# Patient Record
Sex: FEMALE | Race: BLACK OR AFRICAN AMERICAN | NOT HISPANIC OR LATINO | Employment: UNEMPLOYED | URBAN - METROPOLITAN AREA
[De-identification: names, ages, dates, MRNs, and addresses within clinical notes are randomized per-mention and may not be internally consistent; named-entity substitution may affect disease eponyms.]

---

## 2018-11-24 ENCOUNTER — HOSPITAL ENCOUNTER (INPATIENT)
Facility: OTHER | Age: 23
LOS: 6 days | Discharge: HOME | DRG: 419 | End: 2018-11-30
Attending: EMERGENCY MEDICINE | Admitting: EMERGENCY MEDICINE
Payer: MEDICAID

## 2018-11-24 DIAGNOSIS — R10.11 RUQ ABDOMINAL PAIN: ICD-10-CM

## 2018-11-24 DIAGNOSIS — K80.00 CALCULUS OF GALLBLADDER WITH ACUTE CHOLECYSTITIS WITHOUT OBSTRUCTION: ICD-10-CM

## 2018-11-24 DIAGNOSIS — R10.13 EPIGASTRIC ABDOMINAL PAIN: ICD-10-CM

## 2018-11-24 DIAGNOSIS — R10.11 INTRACTABLE RIGHT UPPER QUADRANT ABDOMINAL PAIN: ICD-10-CM

## 2018-11-24 DIAGNOSIS — K80.20 CALCULUS OF GALLBLADDER WITHOUT CHOLECYSTITIS WITHOUT OBSTRUCTION: Primary | ICD-10-CM

## 2018-11-24 DIAGNOSIS — K80.50 CHOLEDOCHOLITHIASIS: ICD-10-CM

## 2018-11-24 LAB
ALBUMIN SERPL BCP-MCNC: 3.9 G/DL
ALP SERPL-CCNC: 80 U/L
ALT SERPL W/O P-5'-P-CCNC: 14 U/L
ANION GAP SERPL CALC-SCNC: 10 MMOL/L
AST SERPL-CCNC: 13 U/L
B-HCG UR QL: NEGATIVE
BASOPHILS # BLD AUTO: 0.04 K/UL
BASOPHILS NFR BLD: 0.6 %
BILIRUB SERPL-MCNC: 0.2 MG/DL
BILIRUB UR QL STRIP: NEGATIVE
BUN SERPL-MCNC: 11 MG/DL
CALCIUM SERPL-MCNC: 9.5 MG/DL
CHLORIDE SERPL-SCNC: 107 MMOL/L
CLARITY UR: CLEAR
CO2 SERPL-SCNC: 22 MMOL/L
COLOR UR: YELLOW
CREAT SERPL-MCNC: 0.8 MG/DL
CTP QC/QA: YES
DIFFERENTIAL METHOD: ABNORMAL
EOSINOPHIL # BLD AUTO: 0.2 K/UL
EOSINOPHIL NFR BLD: 2.4 %
ERYTHROCYTE [DISTWIDTH] IN BLOOD BY AUTOMATED COUNT: 13.9 %
EST. GFR  (AFRICAN AMERICAN): >60 ML/MIN/1.73 M^2
EST. GFR  (NON AFRICAN AMERICAN): >60 ML/MIN/1.73 M^2
GLUCOSE SERPL-MCNC: 84 MG/DL
GLUCOSE UR QL STRIP: NEGATIVE
HCT VFR BLD AUTO: 37.5 %
HGB BLD-MCNC: 12.6 G/DL
HGB UR QL STRIP: NEGATIVE
KETONES UR QL STRIP: NEGATIVE
LEUKOCYTE ESTERASE UR QL STRIP: NEGATIVE
LIPASE SERPL-CCNC: 27 U/L
LYMPHOCYTES # BLD AUTO: 2.1 K/UL
LYMPHOCYTES NFR BLD: 31.9 %
MCH RBC QN AUTO: 27.2 PG
MCHC RBC AUTO-ENTMCNC: 33.6 G/DL
MCV RBC AUTO: 81 FL
MONOCYTES # BLD AUTO: 0.5 K/UL
MONOCYTES NFR BLD: 7.3 %
NEUTROPHILS # BLD AUTO: 3.8 K/UL
NEUTROPHILS NFR BLD: 57.7 %
NITRITE UR QL STRIP: NEGATIVE
PH UR STRIP: 6 [PH] (ref 5–8)
PLATELET # BLD AUTO: 277 K/UL
PMV BLD AUTO: 11.1 FL
POTASSIUM SERPL-SCNC: 3.9 MMOL/L
PROT SERPL-MCNC: 7.2 G/DL
PROT UR QL STRIP: NEGATIVE
RBC # BLD AUTO: 4.63 M/UL
SODIUM SERPL-SCNC: 139 MMOL/L
SP GR UR STRIP: 1.01 (ref 1–1.03)
URN SPEC COLLECT METH UR: NORMAL
UROBILINOGEN UR STRIP-ACNC: NEGATIVE EU/DL
WBC # BLD AUTO: 6.67 K/UL

## 2018-11-24 PROCEDURE — 81025 URINE PREGNANCY TEST: CPT | Performed by: EMERGENCY MEDICINE

## 2018-11-24 PROCEDURE — 25000003 PHARM REV CODE 250: Performed by: PHYSICIAN ASSISTANT

## 2018-11-24 PROCEDURE — 99285 EMERGENCY DEPT VISIT HI MDM: CPT | Mod: 25

## 2018-11-24 PROCEDURE — G0378 HOSPITAL OBSERVATION PER HR: HCPCS

## 2018-11-24 PROCEDURE — 96361 HYDRATE IV INFUSION ADD-ON: CPT

## 2018-11-24 PROCEDURE — 93005 ELECTROCARDIOGRAM TRACING: CPT

## 2018-11-24 PROCEDURE — 81003 URINALYSIS AUTO W/O SCOPE: CPT

## 2018-11-24 PROCEDURE — 80053 COMPREHEN METABOLIC PANEL: CPT

## 2018-11-24 PROCEDURE — 93010 ELECTROCARDIOGRAM REPORT: CPT | Mod: ,,, | Performed by: INTERNAL MEDICINE

## 2018-11-24 PROCEDURE — 96375 TX/PRO/DX INJ NEW DRUG ADDON: CPT

## 2018-11-24 PROCEDURE — S0028 INJECTION, FAMOTIDINE, 20 MG: HCPCS | Performed by: PHYSICIAN ASSISTANT

## 2018-11-24 PROCEDURE — 96374 THER/PROPH/DIAG INJ IV PUSH: CPT

## 2018-11-24 PROCEDURE — 12000002 HC ACUTE/MED SURGE SEMI-PRIVATE ROOM

## 2018-11-24 PROCEDURE — 85025 COMPLETE CBC W/AUTO DIFF WBC: CPT

## 2018-11-24 PROCEDURE — 63600175 PHARM REV CODE 636 W HCPCS: Performed by: PHYSICIAN ASSISTANT

## 2018-11-24 PROCEDURE — 83690 ASSAY OF LIPASE: CPT

## 2018-11-24 RX ORDER — ONDANSETRON 2 MG/ML
4 INJECTION INTRAMUSCULAR; INTRAVENOUS
Status: COMPLETED | OUTPATIENT
Start: 2018-11-24 | End: 2018-11-24

## 2018-11-24 RX ORDER — FAMOTIDINE 10 MG/ML
20 INJECTION INTRAVENOUS
Status: COMPLETED | OUTPATIENT
Start: 2018-11-24 | End: 2018-11-24

## 2018-11-24 RX ORDER — KETOROLAC TROMETHAMINE 30 MG/ML
15 INJECTION, SOLUTION INTRAMUSCULAR; INTRAVENOUS
Status: COMPLETED | OUTPATIENT
Start: 2018-11-25 | End: 2018-11-25

## 2018-11-24 RX ORDER — ACETAMINOPHEN 325 MG/1
650 TABLET ORAL EVERY 8 HOURS PRN
Status: CANCELLED | OUTPATIENT
Start: 2018-11-25

## 2018-11-24 RX ADMIN — FAMOTIDINE 20 MG: 10 INJECTION, SOLUTION INTRAVENOUS at 10:11

## 2018-11-24 RX ADMIN — SODIUM CHLORIDE 1000 ML: 0.9 INJECTION, SOLUTION INTRAVENOUS at 10:11

## 2018-11-24 RX ADMIN — ONDANSETRON 4 MG: 2 INJECTION INTRAMUSCULAR; INTRAVENOUS at 10:11

## 2018-11-25 PROBLEM — K80.00 CALCULUS OF GALLBLADDER WITH ACUTE CHOLECYSTITIS WITHOUT OBSTRUCTION: Status: ACTIVE | Noted: 2018-11-24

## 2018-11-25 PROBLEM — R10.11 INTRACTABLE RIGHT UPPER QUADRANT ABDOMINAL PAIN: Status: ACTIVE | Noted: 2018-11-25

## 2018-11-25 LAB
ALBUMIN SERPL BCP-MCNC: 3.6 G/DL
ALP SERPL-CCNC: 77 U/L
ALT SERPL W/O P-5'-P-CCNC: 13 U/L
ANION GAP SERPL CALC-SCNC: 9 MMOL/L
AST SERPL-CCNC: 25 U/L
BASOPHILS # BLD AUTO: 0.02 K/UL
BASOPHILS NFR BLD: 0.2 %
BILIRUB SERPL-MCNC: 0.4 MG/DL
BUN SERPL-MCNC: 10 MG/DL
CALCIUM SERPL-MCNC: 8.9 MG/DL
CHLORIDE SERPL-SCNC: 108 MMOL/L
CHOLEST SERPL-MCNC: 158 MG/DL
CHOLEST/HDLC SERPL: 3.7 {RATIO}
CO2 SERPL-SCNC: 23 MMOL/L
CREAT SERPL-MCNC: 0.8 MG/DL
DIFFERENTIAL METHOD: ABNORMAL
EOSINOPHIL # BLD AUTO: 0.2 K/UL
EOSINOPHIL NFR BLD: 1.8 %
ERYTHROCYTE [DISTWIDTH] IN BLOOD BY AUTOMATED COUNT: 13.8 %
EST. GFR  (AFRICAN AMERICAN): >60 ML/MIN/1.73 M^2
EST. GFR  (NON AFRICAN AMERICAN): >60 ML/MIN/1.73 M^2
ESTIMATED AVG GLUCOSE: 103 MG/DL
GLUCOSE SERPL-MCNC: 91 MG/DL
HBA1C MFR BLD HPLC: 5.2 %
HCT VFR BLD AUTO: 36.1 %
HDLC SERPL-MCNC: 43 MG/DL
HDLC SERPL: 27.2 %
HGB BLD-MCNC: 12 G/DL
LDLC SERPL CALC-MCNC: 102.4 MG/DL
LYMPHOCYTES # BLD AUTO: 2.4 K/UL
LYMPHOCYTES NFR BLD: 29.3 %
MAGNESIUM SERPL-MCNC: 2 MG/DL
MCH RBC QN AUTO: 26.8 PG
MCHC RBC AUTO-ENTMCNC: 33.2 G/DL
MCV RBC AUTO: 81 FL
MONOCYTES # BLD AUTO: 0.7 K/UL
MONOCYTES NFR BLD: 8.6 %
NEUTROPHILS # BLD AUTO: 4.9 K/UL
NEUTROPHILS NFR BLD: 59.9 %
NONHDLC SERPL-MCNC: 115 MG/DL
PHOSPHATE SERPL-MCNC: 3.9 MG/DL
PLATELET # BLD AUTO: 287 K/UL
PMV BLD AUTO: 11 FL
POTASSIUM SERPL-SCNC: 3.7 MMOL/L
PROT SERPL-MCNC: 6.6 G/DL
RBC # BLD AUTO: 4.47 M/UL
SODIUM SERPL-SCNC: 140 MMOL/L
TRIGL SERPL-MCNC: 63 MG/DL
WBC # BLD AUTO: 8.13 K/UL

## 2018-11-25 PROCEDURE — G0378 HOSPITAL OBSERVATION PER HR: HCPCS

## 2018-11-25 PROCEDURE — 83735 ASSAY OF MAGNESIUM: CPT

## 2018-11-25 PROCEDURE — 80053 COMPREHEN METABOLIC PANEL: CPT

## 2018-11-25 PROCEDURE — 99220 PR INITIAL OBSERVATION CARE,LEVL III: CPT | Mod: ,,, | Performed by: NURSE PRACTITIONER

## 2018-11-25 PROCEDURE — 96375 TX/PRO/DX INJ NEW DRUG ADDON: CPT

## 2018-11-25 PROCEDURE — 94761 N-INVAS EAR/PLS OXIMETRY MLT: CPT

## 2018-11-25 PROCEDURE — 83036 HEMOGLOBIN GLYCOSYLATED A1C: CPT

## 2018-11-25 PROCEDURE — 84100 ASSAY OF PHOSPHORUS: CPT

## 2018-11-25 PROCEDURE — 25000003 PHARM REV CODE 250: Performed by: NURSE PRACTITIONER

## 2018-11-25 PROCEDURE — 85025 COMPLETE CBC W/AUTO DIFF WBC: CPT

## 2018-11-25 PROCEDURE — 36415 COLL VENOUS BLD VENIPUNCTURE: CPT

## 2018-11-25 PROCEDURE — 63600175 PHARM REV CODE 636 W HCPCS: Performed by: PHYSICIAN ASSISTANT

## 2018-11-25 PROCEDURE — 11000001 HC ACUTE MED/SURG PRIVATE ROOM

## 2018-11-25 PROCEDURE — 80061 LIPID PANEL: CPT

## 2018-11-25 RX ORDER — KETOROLAC TROMETHAMINE 30 MG/ML
15 INJECTION, SOLUTION INTRAMUSCULAR; INTRAVENOUS EVERY 6 HOURS PRN
Status: DISPENSED | OUTPATIENT
Start: 2018-11-25 | End: 2018-11-28

## 2018-11-25 RX ORDER — SODIUM CHLORIDE 9 MG/ML
INJECTION, SOLUTION INTRAVENOUS CONTINUOUS
Status: DISCONTINUED | OUTPATIENT
Start: 2018-11-25 | End: 2018-11-29

## 2018-11-25 RX ORDER — ACETAMINOPHEN 325 MG/1
650 TABLET ORAL EVERY 8 HOURS PRN
Status: DISCONTINUED | OUTPATIENT
Start: 2018-11-25 | End: 2018-11-28

## 2018-11-25 RX ORDER — SODIUM CHLORIDE 0.9 % (FLUSH) 0.9 %
5 SYRINGE (ML) INJECTION
Status: DISCONTINUED | OUTPATIENT
Start: 2018-11-25 | End: 2018-11-30 | Stop reason: HOSPADM

## 2018-11-25 RX ORDER — ONDANSETRON 2 MG/ML
4 INJECTION INTRAMUSCULAR; INTRAVENOUS EVERY 8 HOURS PRN
Status: DISCONTINUED | OUTPATIENT
Start: 2018-11-25 | End: 2018-11-30 | Stop reason: HOSPADM

## 2018-11-25 RX ADMIN — KETOROLAC TROMETHAMINE 15 MG: 30 INJECTION, SOLUTION INTRAMUSCULAR at 12:11

## 2018-11-25 RX ADMIN — KETOROLAC TROMETHAMINE 15 MG: 30 INJECTION, SOLUTION INTRAMUSCULAR at 11:11

## 2018-11-25 RX ADMIN — SODIUM CHLORIDE: 0.9 INJECTION, SOLUTION INTRAVENOUS at 07:11

## 2018-11-25 RX ADMIN — KETOROLAC TROMETHAMINE 15 MG: 30 INJECTION, SOLUTION INTRAMUSCULAR at 07:11

## 2018-11-25 RX ADMIN — SODIUM CHLORIDE: 0.9 INJECTION, SOLUTION INTRAVENOUS at 01:11

## 2018-11-25 RX ADMIN — SODIUM CHLORIDE: 0.9 INJECTION, SOLUTION INTRAVENOUS at 04:11

## 2018-11-25 NOTE — ASSESSMENT & PLAN NOTE
-US abdomen 11/24/2018:  Cholelithiasis; sonographic Rios sign suggesting acute cholecystitis; no secondary signs of gallbladder wall thickening, hyperemia, or pericholecystic fluid seen.  -labs without leukocytosis, lipase normal, liver function tests normal; will monitor with morning labs  -General Surgery consult pending  -NPO for now and continue normal saline at 125 cc/hour

## 2018-11-25 NOTE — ASSESSMENT & PLAN NOTE
-US abdomen 11/24/2018:  Cholelithiasis; sonographic Rios sign suggesting acute cholecystitis; no secondary signs of gallbladder wall thickening, hyperemia, or pericholecystic fluid seen.  -pain control with toradol 15 mg intravenously every six hours as needed

## 2018-11-25 NOTE — PROGRESS NOTES
"[facility] Hospital Medicine  Progress Note    Patient Name: Nicolasa Peter  MRN: [id number]  Patient Class: OP- Observation   Admission Date: 11/24/2018  Length of Stay: 0 days  Attending Physician: Song Soni MD  Primary Care Provider: Edgar Vega MD        Subjective:     Principal Problem:Calculus of gallbladder with acute cholecystitis without obstruction    HPI:  Ms. Nicolasa Peter is a 23 year old female with no significant past medical history who presented to the [facility] Department with complaint of worsening epigastric and right upper quadrant abdominal pain.  She reports the pain as "sharp aching" that radiates to right flank is accompanied by abdominal distention.  She reports intermittent nausea without vomiting or diarrhea.  She states as a result that she has decreased appetite and that pain worsens after eating.  She states she has tried Tums and naprosyn without relief.  She states that her symptoms began on Wednesday. She denies fever/ chills, vomiting or diarrhea. Initial work up in the ED with abdominal ultrasound positive for sonographic Rios's sign and evidence of cholelithiasis with concern for cholecystitis.  She will be admitted under Observation for further evaluation and management  of cholelithiasis.          Hospital Course:  Ms. Nicolasa Peter was admitted under Observation for evaluation of right upper quadrant pain.  Ultrasound of the abdomen completed in the ED does show evidence of cholelithiasis with reported positive sonographic Rios's test concerning for acute cholecystitis.  Initial testing, however, did not show signs of infection with urine negative, lipase and liver function tests not elevated, no leukocytosis and the patient has remained afebrile.  General Surgery has been consulted and will appreciate further evaluation and recommendations.  She will remain NPO until evaluation by General Surgery and will continue " intravenous fluid hydration.  Plan to continue Zofran 4 mg intravenously every eight hours as needed for nausea and toradol 15 mg intravenously every six hours as needed for moderate            Interval History: continues with epigastric and right upper quadrant pain; no nasuea/vomiting/diarrhea; NPO for now and continue IV hydration; General Surgery consult pending    Review of Systems   Constitutional: Positive for activity change, appetite change and fatigue. Negative for chills and fever.   HENT: Negative for congestion and trouble swallowing.    Eyes: Negative for visual disturbance.   Respiratory: Negative for shortness of breath.    Cardiovascular: Negative for chest pain and palpitations.   Gastrointestinal: Positive for abdominal distention, abdominal pain (right side that radiates to back) and nausea. Negative for diarrhea and vomiting.   Endocrine: Negative for cold intolerance and heat intolerance.   Genitourinary: Positive for flank pain. Negative for dysuria, hematuria and urgency.   Skin: Negative.    Neurological: Negative.    Psychiatric/Behavioral: Negative.      Objective:     Vital Signs (Most Recent):  Temp: 98.6 °F (37 °C) (11/25/18 1318)  Pulse: 74 (11/25/18 1318)  Resp: 16 (11/25/18 0803)  BP: (!) 93/46 (11/25/18 1318)  SpO2: 99 % (11/25/18 1318) Vital Signs (24h Range):  Temp:  [98.2 °F (36.8 °C)-98.8 °F (37.1 °C)] 98.6 °F (37 °C)  Pulse:  [63-76] 74  Resp:  [16-20] 16  SpO2:  [96 %-100 %] 99 %  BP: ()/(46-76) 93/46     Weight: 84.4 kg (186 lb)  Body mass index is 31.93 kg/m².    Intake/Output Summary (Last 24 hours) at 11/25/2018 1400  Last data filed at 11/25/2018 0047  Gross per 24 hour   Intake 1000 ml   Output --   Net 1000 ml      Physical Exam   Constitutional: She is oriented to person, place, and time. She appears well-developed and well-nourished. She appears ill.   HENT:   Head: Normocephalic.   Mouth/Throat: Oropharynx is clear and moist.   Eyes: Conjunctivae and EOM are  normal. Pupils are equal, round, and reactive to light.   Neck: Normal range of motion. Neck supple.   Cardiovascular: Normal rate, regular rhythm, normal heart sounds and intact distal pulses.   Pulses:       Dorsalis pedis pulses are 2+ on the right side, and 2+ on the left side.   Pulmonary/Chest: Effort normal and breath sounds normal. No respiratory distress.   Abdominal: Soft. She exhibits distension. Bowel sounds are decreased. There is tenderness in the right upper quadrant and epigastric area.   Musculoskeletal: Normal range of motion.   Lymphadenopathy:     She has no cervical adenopathy.   Neurological: She is alert and oriented to person, place, and time. She has normal strength.   Skin: Skin is warm and dry.   Psychiatric: She has a normal mood and affect. Her speech is normal and behavior is normal.       Significant Labs:   CBC:   Recent Labs   Lab 11/24/18 2249 11/25/18 0448   WBC 6.67 8.13   HGB 12.6 12.0   HCT 37.5 36.1*    287     CMP:   Recent Labs   Lab 11/24/18 2249 11/25/18 0448    140   K 3.9 3.7    108   CO2 22* 23   GLU 84 91   BUN 11 10   CREATININE 0.8 0.8   CALCIUM 9.5 8.9   PROT 7.2 6.6   ALBUMIN 3.9 3.6   BILITOT 0.2 0.4   ALKPHOS 80 77   AST 13 25   ALT 14 13   ANIONGAP 10 9   EGFRNONAA >60 >60     Lipase:   Recent Labs   Lab 11/24/18 2249   LIPASE 27     Urine Studies:   Recent Labs   Lab 11/24/18  2331   COLORU Yellow   APPEARANCEUA Clear   PHUR 6.0   SPECGRAV 1.010   PROTEINUA Negative   GLUCUA Negative   KETONESU Negative   BILIRUBINUA Negative   OCCULTUA Negative   NITRITE Negative   UROBILINOGEN Negative   LEUKOCYTESUR Negative     All pertinent labs within the past 24 hours have been reviewed.    Significant Imaging: I have reviewed all pertinent imaging results/findings within the past 24 hours.     Imaging Results          US Abdomen Limited (Final result)  Result time 11/24/18 23:37:55    Final result by Josie Paredes MD (11/24/18 23:37:55)                  Impression:      1. Cholelithiasis.  Sonographic Rios sign is reportedly positive per ultrasonographer which would suggest acute cholecystitis, however there are no secondary signs of gallbladder wall thickening, hyperemia, or pericholecystic fluid seen.  2. Mild hepatomegaly.      Electronically signed by: Josie Paredes MD  Date:    11/24/2018  Time:    23:37             Narrative:    EXAMINATION:  US ABDOMEN LIMITED    CLINICAL HISTORY:  RUQ/epigastric abdominal pain;    TECHNIQUE:  Limited ultrasound of the right upper quadrant of the abdomen (including pancreas, liver, gallbladder, common bile duct, and right kidney) was performed.    COMPARISON:  None.    FINDINGS:  The liver is mildly enlarged measuring 18.4cm.  Hepatic parenchyma is homogeneous without evidence for masses.  No intra- or extrahepatic biliary ductal dilatation. The common bile duct measures 0.5 cm.  The gallbladder demonstrates multiple mobile stones.  No evidence of gallbladder wall thickening, hyperemia, or pericholecystic fluid.  Sonographic Rios's sign is reportedly positive.  The visualized portions of the pancreas, IVC, and aorta appear normal. The right kidney measures 11.2 cm. No ascites.                                  Assessment/Plan:      * Calculus of gallbladder with acute cholecystitis without obstruction    -US abdomen 11/24/2018:  Cholelithiasis; sonographic Rios sign suggesting acute cholecystitis; no secondary signs of gallbladder wall thickening, hyperemia, or pericholecystic fluid seen.  -labs without leukocytosis, lipase normal, liver function tests normal; will monitor with morning labs  -General Surgery consult pending  -NPO for now and continue normal saline at 125 cc/hour     Intractable right upper quadrant abdominal pain    -US abdomen 11/24/2018:  Cholelithiasis; sonographic Rios sign suggesting acute cholecystitis; no secondary signs of gallbladder wall thickening, hyperemia, or pericholecystic  fluid seen.  -pain control with toradol 15 mg intravenously every six hours as needed         VTE Risk Mitigation (From admission, onward)        Ordered     Reason for no Mechanical VTE Prophylaxis  Once      11/25/18 0143     IP VTE LOW RISK PATIENT  Once      11/25/18 0143              Lainey Hurley NP  Department of Hospital Medicine   [facility]

## 2018-11-25 NOTE — ASSESSMENT & PLAN NOTE
US- Cholelithiasis.  Sonographic Rois sign is reportedly positive per ultrasonographer which would suggest acute cholecystitis, however there are no secondary signs of gallbladder wall thickening, hyperemia, or pericholecystic fluid seen.    General Surgery consulted in ER  Monitor overnight for increasing symptoms  NPO  IVF at 125

## 2018-11-25 NOTE — HOSPITAL COURSE
22 y/o female admitted with RUQ pain. Ultrasound of the abdomen completed in the ED showed evidence of cholelithiasis with reported positive sonographic Rios's test concerning for acute cholecystitis.  General Surgery consulted; s/p  cholecystectomy on Monday, November 26, 2018.  Hospital course noteable for elevated Liver function tests post operatively associated with N/V, abdominal pain. US showed new intra and extrahepatic biliary dilation without convincing calculus seen within the prominent common duct although the medial-most aspect of the common duct is unable to be visualized secondary to adjacent structures.  MRCP revealed Mild-moderate intra and extrahepatic biliary ductal dilatation with a questionable 3 mm filling defect within the distal common duct potentially indicating a retained calculus.  She underwent ERCP by Dr. Tuttle on 11/29 with excellent results.  Her pain has nearly resolved, she is eating and stooling without difficulty and is eager to go home.  She has been cleared for discharge by GI and surgery and will be discharged home today.

## 2018-11-25 NOTE — HPI
"Ms. Nicolasa Peter is a 23 year old female with no significant past medical history who presented to the [facility] Department with complaint of worsening epigastric and right upper quadrant abdominal pain.  She reports the pain as "sharp aching" that radiates to right flank is accompanied by abdominal distention.  She reports intermittent nausea without vomiting or diarrhea.  She states as a result that she has decreased appetite and that pain worsens after eating.  She states she has tried Tums and naprosyn without relief.  She states that her symptoms began on Wednesday. She denies fever/ chills, vomiting or diarrhea. Initial work up in the ED with abdominal ultrasound positive for sonographic Rios's sign and evidence of cholelithiasis with concern for cholecystitis.  She will be admitted under Observation for further evaluation and management  of cholelithiasis.        "

## 2018-11-25 NOTE — PLAN OF CARE
RNCM met with patient at the bedside.      Patient is alert and oriented with no communication barriers.      Prior to admission patient was independent with ADLs. Patient denies the use of HH or DME .       Patients PCP is correct on the face sheet. Patient choice pharmacy is CORRECT      Patient denies a history of mental illness.         Patients family will transport him home at discharge.         No CM needs identified at this time.       CM team will continue to follow.        11/25/18 1643   Discharge Assessment   Confirmed/corrected address and phone number on facesheet? Yes   Assessment information obtained from? Patient   Communicated expected length of stay with patient/caregiver yes   Prior to hospitilization cognitive status: Alert/Oriented   Prior to hospitalization functional status: Independent   Current cognitive status: Alert/Oriented   Current Functional Status: Independent   Able to Return to Prior Arrangements yes   Is patient able to care for self after discharge? Yes   Patient currently being followed by outpatient case management? No   Patient currently receives any other outside agency services? No   Equipment Currently Used at Home none   Do you have any problems affording any of your prescribed medications? TBD   Is the patient taking medications as prescribed? yes   Does the patient have transportation home? Yes   Does the patient receive services at the [facility]? No   Discharge Plan A Home with family   Discharge Plan B Home with family   Patient/Family In Agreement With Plan yes

## 2018-11-25 NOTE — SUBJECTIVE & OBJECTIVE
Interval History: continues with epigastric and right upper quadrant pain; no nasuea/vomiting/diarrhea; NPO for now and continue IV hydration; General Surgery consult pending    Review of Systems   Constitutional: Positive for activity change, appetite change and fatigue. Negative for chills and fever.   HENT: Negative for congestion and trouble swallowing.    Eyes: Negative for visual disturbance.   Respiratory: Negative for shortness of breath.    Cardiovascular: Negative for chest pain and palpitations.   Gastrointestinal: Positive for abdominal distention, abdominal pain (right side that radiates to back) and nausea. Negative for diarrhea and vomiting.   Endocrine: Negative for cold intolerance and heat intolerance.   Genitourinary: Positive for flank pain. Negative for dysuria, hematuria and urgency.   Skin: Negative.    Neurological: Negative.    Psychiatric/Behavioral: Negative.      Objective:     Vital Signs (Most Recent):  Temp: 98.6 °F (37 °C) (11/25/18 1318)  Pulse: 74 (11/25/18 1318)  Resp: 16 (11/25/18 0803)  BP: (!) 93/46 (11/25/18 1318)  SpO2: 99 % (11/25/18 1318) Vital Signs (24h Range):  Temp:  [98.2 °F (36.8 °C)-98.8 °F (37.1 °C)] 98.6 °F (37 °C)  Pulse:  [63-76] 74  Resp:  [16-20] 16  SpO2:  [96 %-100 %] 99 %  BP: ()/(46-76) 93/46     Weight: 84.4 kg (186 lb)  Body mass index is 31.93 kg/m².    Intake/Output Summary (Last 24 hours) at 11/25/2018 1400  Last data filed at 11/25/2018 0047  Gross per 24 hour   Intake 1000 ml   Output --   Net 1000 ml      Physical Exam   Constitutional: She is oriented to person, place, and time. She appears well-developed and well-nourished. She appears ill.   HENT:   Head: Normocephalic.   Mouth/Throat: Oropharynx is clear and moist.   Eyes: Conjunctivae and EOM are normal. Pupils are equal, round, and reactive to light.   Neck: Normal range of motion. Neck supple.   Cardiovascular: Normal rate, regular rhythm, normal heart sounds and intact distal pulses.    Pulses:       Dorsalis pedis pulses are 2+ on the right side, and 2+ on the left side.   Pulmonary/Chest: Effort normal and breath sounds normal. No respiratory distress.   Abdominal: Soft. She exhibits distension. Bowel sounds are decreased. There is tenderness in the right upper quadrant and epigastric area.   Musculoskeletal: Normal range of motion.   Lymphadenopathy:     She has no cervical adenopathy.   Neurological: She is alert and oriented to person, place, and time. She has normal strength.   Skin: Skin is warm and dry.   Psychiatric: She has a normal mood and affect. Her speech is normal and behavior is normal.       Significant Labs:   CBC:   Recent Labs   Lab 11/24/18 2249 11/25/18 0448   WBC 6.67 8.13   HGB 12.6 12.0   HCT 37.5 36.1*    287     CMP:   Recent Labs   Lab 11/24/18 2249 11/25/18 0448    140   K 3.9 3.7    108   CO2 22* 23   GLU 84 91   BUN 11 10   CREATININE 0.8 0.8   CALCIUM 9.5 8.9   PROT 7.2 6.6   ALBUMIN 3.9 3.6   BILITOT 0.2 0.4   ALKPHOS 80 77   AST 13 25   ALT 14 13   ANIONGAP 10 9   EGFRNONAA >60 >60     Lipase:   Recent Labs   Lab 11/24/18 2249   LIPASE 27     Urine Studies:   Recent Labs   Lab 11/24/18  2331   COLORU Yellow   APPEARANCEUA Clear   PHUR 6.0   SPECGRAV 1.010   PROTEINUA Negative   GLUCUA Negative   KETONESU Negative   BILIRUBINUA Negative   OCCULTUA Negative   NITRITE Negative   UROBILINOGEN Negative   LEUKOCYTESUR Negative     All pertinent labs within the past 24 hours have been reviewed.    Significant Imaging: I have reviewed all pertinent imaging results/findings within the past 24 hours.     Imaging Results          US Abdomen Limited (Final result)  Result time 11/24/18 23:37:55    Final result by Josie Paredes MD (11/24/18 23:37:55)                 Impression:      1. Cholelithiasis.  Sonographic Rios sign is reportedly positive per ultrasonographer which would suggest acute cholecystitis, however there are no secondary signs  of gallbladder wall thickening, hyperemia, or pericholecystic fluid seen.  2. Mild hepatomegaly.      Electronically signed by: Josie Paredes MD  Date:    11/24/2018  Time:    23:37             Narrative:    EXAMINATION:  US ABDOMEN LIMITED    CLINICAL HISTORY:  RUQ/epigastric abdominal pain;    TECHNIQUE:  Limited ultrasound of the right upper quadrant of the abdomen (including pancreas, liver, gallbladder, common bile duct, and right kidney) was performed.    COMPARISON:  None.    FINDINGS:  The liver is mildly enlarged measuring 18.4cm.  Hepatic parenchyma is homogeneous without evidence for masses.  No intra- or extrahepatic biliary ductal dilatation. The common bile duct measures 0.5 cm.  The gallbladder demonstrates multiple mobile stones.  No evidence of gallbladder wall thickening, hyperemia, or pericholecystic fluid.  Sonographic Rios's sign is reportedly positive.  The visualized portions of the pancreas, IVC, and aorta appear normal. The right kidney measures 11.2 cm. No ascites.

## 2018-11-25 NOTE — SUBJECTIVE & OBJECTIVE
History reviewed. No pertinent past medical history.    History reviewed. No pertinent surgical history.    Review of patient's allergies indicates:  No Known Allergies    No current facility-administered medications on file prior to encounter.      Current Outpatient Medications on File Prior to Encounter   Medication Sig    PRENATAL VIT/IRON FUM/FOLIC AC (PRENATAL 1+1 ORAL) Take by mouth.     Family History     None        Tobacco Use    Smoking status: Never Smoker   Substance and Sexual Activity    Alcohol use: No    Drug use: No    Sexual activity: Yes     Partners: Male     Review of Systems   Constitutional: Positive for activity change, appetite change, fatigue and fever.   HENT: Negative for congestion, ear pain, rhinorrhea and sinus pressure.    Eyes: Negative for pain and discharge.   Respiratory: Negative for cough, chest tightness, shortness of breath and wheezing.    Cardiovascular: Negative for chest pain and leg swelling.   Gastrointestinal: Positive for abdominal distention, abdominal pain, nausea and vomiting. Negative for diarrhea.   Endocrine: Negative for cold intolerance and heat intolerance.   Genitourinary: Negative for difficulty urinating, flank pain, frequency, hematuria and urgency.   Musculoskeletal: Negative for arthralgias, joint swelling and myalgias.   Allergic/Immunologic: Negative for environmental allergies and food allergies.   Neurological: Negative for dizziness, weakness, light-headedness and headaches.   Hematological: Does not bruise/bleed easily.   Psychiatric/Behavioral: Negative for agitation, behavioral problems and decreased concentration.     Objective:     Vital Signs (Most Recent):  Temp: 98.2 °F (36.8 °C) (11/25/18 0135)  Pulse: 63 (11/25/18 0135)  Resp: 18 (11/25/18 0135)  BP: 114/71 (11/25/18 0135)  SpO2: 100 % (11/25/18 0135) Vital Signs (24h Range):  Temp:  [98.2 °F (36.8 °C)-98.8 °F (37.1 °C)] 98.2 °F (36.8 °C)  Pulse:  [63-76] 63  Resp:  [18-20] 18  SpO2:   [96 %-100 %] 100 %  BP: (101-134)/(56-76) 114/71     Weight: 84.4 kg (186 lb)  Body mass index is 31.93 kg/m².    Physical Exam   Constitutional: She is oriented to person, place, and time. She appears well-developed and well-nourished.   HENT:   Head: Normocephalic.   Eyes: Conjunctivae are normal. Right eye exhibits no discharge. Left eye exhibits no discharge.   Neck: Normal range of motion. Neck supple.   Cardiovascular: Normal rate, regular rhythm, normal heart sounds and intact distal pulses.   Pulmonary/Chest: Effort normal and breath sounds normal. No respiratory distress.   Abdominal: Soft. She exhibits no distension. Bowel sounds are increased. There is generalized tenderness and tenderness in the right upper quadrant.   Musculoskeletal: Normal range of motion.   Neurological: She is alert and oriented to person, place, and time. GCS eye subscore is 4. GCS verbal subscore is 5. GCS motor subscore is 6.   Skin: Skin is warm and dry. There is pallor.   Psychiatric: She has a normal mood and affect. Her speech is normal and behavior is normal.           Significant Labs:   CBC:   Recent Labs   Lab 11/24/18  2249   WBC 6.67   HGB 12.6   HCT 37.5        CMP:   Recent Labs   Lab 11/24/18  2249      K 3.9      CO2 22*   GLU 84   BUN 11   CREATININE 0.8   CALCIUM 9.5   PROT 7.2   ALBUMIN 3.9   BILITOT 0.2   ALKPHOS 80   AST 13   ALT 14   ANIONGAP 10   EGFRNONAA >60       Significant Imaging: I have reviewed all pertinent imaging results/findings within the past 24 hours.

## 2018-11-25 NOTE — ED NOTES
NURSING ASSESSMENT    Alert, in mod discomfort 2nd-andrés to abd pain; responds appropriately, cooperative;   resp non labored  Strong radial pulse, skin is warm and dry; cap refill < 2 seconds;  Deferred abd exam as pt is having pain and has already been examined by provider;

## 2018-11-25 NOTE — H&P
[facility] Hospital Medicine  History & Physical    Patient Name: Nicolasa Peter  MRN: [id number]  Admission Date: 11/24/2018  Attending Physician: Song Soni MD   Primary Care Provider: Edgar Vega MD         Patient information was obtained from patient, past medical records and ER records.     Subjective:     Principal Problem:Calculus of gallbladder without cholecystitis without obstruction    Chief Complaint:   Chief Complaint   Patient presents with    Abdominal Pain     Reports mid sternal epigastric/ chest pain x 3 days        HPI: The patient is a 23-year-old female with no significant past medical history presents emergency department with complaints of epigastric and right upper quadrant abdominal pain. She states that her symptoms been present since Wednesday.  She denies fever, chills, vomiting or diarrhea.  She reports associated nausea.  She states that the pain is worse after eating and admits that she has not been eating secondary to the pain.  She treated with Tums and naproxen without relief.  She complains of pain as an 8/10.  Denies any previous surgeries to her abdomen.  She states that the pain radiates to her back.           History reviewed. No pertinent past medical history.    History reviewed. No pertinent surgical history.    Review of patient's allergies indicates:  No Known Allergies    No current facility-administered medications on file prior to encounter.      Current Outpatient Medications on File Prior to Encounter   Medication Sig    PRENATAL VIT/IRON FUM/FOLIC AC (PRENATAL 1+1 ORAL) Take by mouth.     Family History     None        Tobacco Use    Smoking status: Never Smoker   Substance and Sexual Activity    Alcohol use: No    Drug use: No    Sexual activity: Yes     Partners: Male     Review of Systems   Constitutional: Positive for activity change, appetite change, fatigue and fever.   HENT: Negative for congestion, ear pain, rhinorrhea  and sinus pressure.    Eyes: Negative for pain and discharge.   Respiratory: Negative for cough, chest tightness, shortness of breath and wheezing.    Cardiovascular: Negative for chest pain and leg swelling.   Gastrointestinal: Positive for abdominal distention, abdominal pain, nausea and vomiting. Negative for diarrhea.   Endocrine: Negative for cold intolerance and heat intolerance.   Genitourinary: Negative for difficulty urinating, flank pain, frequency, hematuria and urgency.   Musculoskeletal: Negative for arthralgias, joint swelling and myalgias.   Allergic/Immunologic: Negative for environmental allergies and food allergies.   Neurological: Negative for dizziness, weakness, light-headedness and headaches.   Hematological: Does not bruise/bleed easily.   Psychiatric/Behavioral: Negative for agitation, behavioral problems and decreased concentration.     Objective:     Vital Signs (Most Recent):  Temp: 98.2 °F (36.8 °C) (11/25/18 0135)  Pulse: 63 (11/25/18 0135)  Resp: 18 (11/25/18 0135)  BP: 114/71 (11/25/18 0135)  SpO2: 100 % (11/25/18 0135) Vital Signs (24h Range):  Temp:  [98.2 °F (36.8 °C)-98.8 °F (37.1 °C)] 98.2 °F (36.8 °C)  Pulse:  [63-76] 63  Resp:  [18-20] 18  SpO2:  [96 %-100 %] 100 %  BP: (101-134)/(56-76) 114/71     Weight: 84.4 kg (186 lb)  Body mass index is 31.93 kg/m².    Physical Exam   Constitutional: She is oriented to person, place, and time. She appears well-developed and well-nourished.   HENT:   Head: Normocephalic.   Eyes: Conjunctivae are normal. Right eye exhibits no discharge. Left eye exhibits no discharge.   Neck: Normal range of motion. Neck supple.   Cardiovascular: Normal rate, regular rhythm, normal heart sounds and intact distal pulses.   Pulmonary/Chest: Effort normal and breath sounds normal. No respiratory distress.   Abdominal: Soft. She exhibits no distension. Bowel sounds are increased. There is generalized tenderness and tenderness in the right upper quadrant.    Musculoskeletal: Normal range of motion.   Neurological: She is alert and oriented to person, place, and time. GCS eye subscore is 4. GCS verbal subscore is 5. GCS motor subscore is 6.   Skin: Skin is warm and dry. There is pallor.   Psychiatric: She has a normal mood and affect. Her speech is normal and behavior is normal.           Significant Labs:   CBC:   Recent Labs   Lab 11/24/18  2249   WBC 6.67   HGB 12.6   HCT 37.5        CMP:   Recent Labs   Lab 11/24/18 2249      K 3.9      CO2 22*   GLU 84   BUN 11   CREATININE 0.8   CALCIUM 9.5   PROT 7.2   ALBUMIN 3.9   BILITOT 0.2   ALKPHOS 80   AST 13   ALT 14   ANIONGAP 10   EGFRNONAA >60       Significant Imaging: I have reviewed all pertinent imaging results/findings within the past 24 hours.    Assessment/Plan:     * Calculus of gallbladder without cholecystitis without obstruction    US- Cholelithiasis.  Sonographic Rios sign is reportedly positive per ultrasonographer which would suggest acute cholecystitis, however there are no secondary signs of gallbladder wall thickening, hyperemia, or pericholecystic fluid seen.    General Surgery consulted in ER  Monitor overnight for increasing symptoms  NPO  IVF at 125       VTE Risk Mitigation (From admission, onward)        Ordered     Reason for no Mechanical VTE Prophylaxis  Once      11/25/18 0143     IP VTE LOW RISK PATIENT  Once      11/25/18 0143             Jw Quiñones NP  Department of Hospital Medicine   [facility]

## 2018-11-25 NOTE — ED PROVIDER NOTES
Encounter Date: 11/24/2018       History     Chief Complaint   Patient presents with    Abdominal Pain     Reports mid sternal epigastric/ chest pain x 3 days     23-year-old female with no significant past medical history presents emergency department with complaints of epigastric and right upper quadrant abdominal pain. She states that her symptoms been present since Wednesday.  She denies fever, chills, vomiting or diarrhea.  She reports associated nausea.  She states that the pain is worse after eating and admits that she has not been eating secondary to the pain.  She treated with times and naproxen without relief.  She complains of pain as an 8/10.  Denies any previous surgeries to her abdomen.  She states that the pain radiates to her back.      The history is provided by the patient.     Review of patient's allergies indicates:  No Known Allergies  History reviewed. No pertinent past medical history.  History reviewed. No pertinent surgical history.  History reviewed. No pertinent family history.  Social History     Tobacco Use    Smoking status: Never Smoker   Substance Use Topics    Alcohol use: No    Drug use: No     Review of Systems   Constitutional: Negative for chills and fever.   HENT: Negative for sore throat.    Respiratory: Positive for shortness of breath. Negative for cough, chest tightness and wheezing.    Cardiovascular: Negative for chest pain and palpitations.   Gastrointestinal: Positive for abdominal pain and nausea. Negative for diarrhea and vomiting.   Genitourinary: Negative for difficulty urinating, dysuria, flank pain and frequency.   Musculoskeletal: Negative for back pain.   Skin: Negative for rash.   Neurological: Negative for weakness.   Hematological: Does not bruise/bleed easily.       Physical Exam     Initial Vitals [11/24/18 2049]   BP Pulse Resp Temp SpO2   134/61 73 18 98.8 °F (37.1 °C) 99 %      MAP       --         Physical Exam    Nursing note and vitals  reviewed.  Constitutional: Vital signs are normal. She appears well-developed and well-nourished. She is not diaphoretic. She is Obese .  Non-toxic appearance. No distress.   HENT:   Head: Normocephalic and atraumatic.   Right Ear: External ear normal.   Left Ear: External ear normal.   Nose: Nose normal.   Mouth/Throat: Uvula is midline, oropharynx is clear and moist and mucous membranes are normal. Mucous membranes are not dry. No trismus in the jaw. No uvula swelling. No oropharyngeal exudate, posterior oropharyngeal edema, posterior oropharyngeal erythema or tonsillar abscesses.   Eyes: Conjunctivae, EOM and lids are normal. Pupils are equal, round, and reactive to light. No scleral icterus.   Neck: Normal range of motion and phonation normal. Neck supple.   Cardiovascular: Normal rate, regular rhythm and normal heart sounds. Exam reveals no gallop and no friction rub.    No murmur heard.  Pulmonary/Chest: Effort normal and breath sounds normal. No respiratory distress. She has no decreased breath sounds. She has no wheezes. She has no rhonchi. She has no rales.   Abdominal: Soft. Normal appearance and bowel sounds are normal. She exhibits no distension and no mass. There is tenderness in the right upper quadrant and epigastric area. There is no rigidity, no rebound, no guarding, no CVA tenderness and no tenderness at McBurney's point.   Musculoskeletal: Normal range of motion.   No obvious deformities, moving all extremities, normal gait   Neurological: She is alert and oriented to person, place, and time. She has normal strength. No sensory deficit.   Skin: Skin is warm, dry and intact. No lesion and no rash noted. No erythema.   Psychiatric: She has a normal mood and affect. Her speech is normal and behavior is normal. Judgment normal. Cognition and memory are normal.         ED Course   Procedures  Labs Reviewed   CBC W/ AUTO DIFFERENTIAL - Abnormal; Notable for the following components:       Result Value     MCV 81 (*)     All other components within normal limits   COMPREHENSIVE METABOLIC PANEL - Abnormal; Notable for the following components:    CO2 22 (*)     All other components within normal limits   LIPASE   URINALYSIS, REFLEX TO URINE CULTURE    Narrative:     Preferred Collection Type->Urine, Clean Catch   POCT URINE PREGNANCY     EKG Readings: (Independently Interpreted)   Initial Reading: No STEMI. Rhythm: Normal Sinus Rhythm. Heart Rate: 69. Ectopy: No Ectopy. Conduction: Normal. ST Segments: Normal ST Segments. T Waves: Normal. Clinical Impression: Normal Sinus Rhythm       Imaging Results          US Abdomen Limited (Final result)  Result time 11/24/18 23:37:55    Final result by Josie Paredes MD (11/24/18 23:37:55)                 Impression:      1. Cholelithiasis.  Sonographic Rios sign is reportedly positive per ultrasonographer which would suggest acute cholecystitis, however there are no secondary signs of gallbladder wall thickening, hyperemia, or pericholecystic fluid seen.  2. Mild hepatomegaly.      Electronically signed by: Josie Paredes MD  Date:    11/24/2018  Time:    23:37             Narrative:    EXAMINATION:  US ABDOMEN LIMITED    CLINICAL HISTORY:  RUQ/epigastric abdominal pain;    TECHNIQUE:  Limited ultrasound of the right upper quadrant of the abdomen (including pancreas, liver, gallbladder, common bile duct, and right kidney) was performed.    COMPARISON:  None.    FINDINGS:  The liver is mildly enlarged measuring 18.4cm.  Hepatic parenchyma is homogeneous without evidence for masses.  No intra- or extrahepatic biliary ductal dilatation. The common bile duct measures 0.5 cm.  The gallbladder demonstrates multiple mobile stones.  No evidence of gallbladder wall thickening, hyperemia, or pericholecystic fluid.  Sonographic Rios's sign is reportedly positive.  The visualized portions of the pancreas, IVC, and aorta appear normal. The right kidney measures 11.2 cm. No  ascites.                                 Medical Decision Making:   History:   Old Medical Records: I decided to obtain old medical records.  Initial Assessment:     23-year-old female with complaints consistent with right upper quadrant abdominal pain with cholelithiasis concerning for possible   Cholecystitis with reported positive sonographic Rios's test.  Vital signs stable, afebrile, neurovascularly intact.  She is alert and healthy and nontoxic appearing.  She is in no apparent distress.  On initial exam she has pain and tenderness palpation the epigastric region as well as the right upper quadrant.  Abdomen is otherwise soft and nontender.  She reports the pain radiates to her back.  No active emesis in the emergency department.  Patient denies alcohol use or history of diabetes.  She denies any previous surgeries to her abdomen.  Clinical Tests:   Lab Tests: Ordered and Reviewed  Radiological Study: Reviewed and Ordered  Medical Tests: Ordered and Reviewed  ED Management:    Urine, labs, EKG and right upper quadrant ultrasound obtained.  Urine shows no evidence of serious bacterial infection, UTI or pyelonephritis.  No elevation white blood cell count H&H is stable.  No significant electrolyte abnormality.  Lipase is not elevated.  LFTs are not elevated.    EKG consistent with normal sinus rhythm with no evidence of acute ischemia or STEMI. Ultrasound does show evidence of cholelithiasis with reported positive sonographic Rios's test concerning for acute cholecystitis however there are no other findings that show evidence of infection.  In the emergency department she was administered IV fluids, Pepcid and Zofran initially.11:53 PM discussed with John Quiñones NP with hospital medicine. Will place in observation with plan for surgery consult in the morning. Patient informed of plan.  She appears very uncomfortable.  Will administer IV Toradol here.  Orders placed for observation for further intervention  and treatment.  She states understanding agrees with this plan.  This patient was discussed with the attending physician who agrees with treatment plan.  Other:   I have discussed this case with another health care provider.       <> Summary of the Discussion: Raheel, attending ED physician [facility]  This note was created using [employer] dictation.  There may be typographical errors secondary to dictation.                        Clinical Impression:     1. Calculus of gallbladder without cholecystitis without obstruction    2. Epigastric abdominal pain    3. RUQ abdominal pain            Disposition:   Disposition: Placed in Observation                        Sweta Myers PA-C  11/25/18 0001

## 2018-11-26 ENCOUNTER — ANESTHESIA EVENT (OUTPATIENT)
Dept: SURGERY | Facility: OTHER | Age: 23
DRG: 419 | End: 2018-11-26
Payer: MEDICAID

## 2018-11-26 ENCOUNTER — ANESTHESIA (OUTPATIENT)
Dept: SURGERY | Facility: OTHER | Age: 23
DRG: 419 | End: 2018-11-26
Payer: MEDICAID

## 2018-11-26 LAB
ALBUMIN SERPL BCP-MCNC: 3.5 G/DL
ALP SERPL-CCNC: 84 U/L
ALT SERPL W/O P-5'-P-CCNC: 10 U/L
ANION GAP SERPL CALC-SCNC: 7 MMOL/L
AST SERPL-CCNC: 13 U/L
BASOPHILS # BLD AUTO: 0.03 K/UL
BASOPHILS NFR BLD: 0.5 %
BILIRUB SERPL-MCNC: 0.2 MG/DL
BUN SERPL-MCNC: 9 MG/DL
CALCIUM SERPL-MCNC: 9 MG/DL
CHLORIDE SERPL-SCNC: 110 MMOL/L
CO2 SERPL-SCNC: 24 MMOL/L
CREAT SERPL-MCNC: 0.8 MG/DL
DIFFERENTIAL METHOD: ABNORMAL
EOSINOPHIL # BLD AUTO: 0.2 K/UL
EOSINOPHIL NFR BLD: 2.4 %
ERYTHROCYTE [DISTWIDTH] IN BLOOD BY AUTOMATED COUNT: 13.9 %
EST. GFR  (AFRICAN AMERICAN): >60 ML/MIN/1.73 M^2
EST. GFR  (NON AFRICAN AMERICAN): >60 ML/MIN/1.73 M^2
GLUCOSE SERPL-MCNC: 99 MG/DL
HCT VFR BLD AUTO: 37.3 %
HGB BLD-MCNC: 12.6 G/DL
LIPASE SERPL-CCNC: 51 U/L
LYMPHOCYTES # BLD AUTO: 2.1 K/UL
LYMPHOCYTES NFR BLD: 32.7 %
MCH RBC QN AUTO: 27.3 PG
MCHC RBC AUTO-ENTMCNC: 33.8 G/DL
MCV RBC AUTO: 81 FL
MONOCYTES # BLD AUTO: 0.7 K/UL
MONOCYTES NFR BLD: 10.2 %
NEUTROPHILS # BLD AUTO: 3.5 K/UL
NEUTROPHILS NFR BLD: 54 %
PLATELET # BLD AUTO: 275 K/UL
PMV BLD AUTO: 11 FL
POTASSIUM SERPL-SCNC: 3.8 MMOL/L
PROT SERPL-MCNC: 6.7 G/DL
RBC # BLD AUTO: 4.61 M/UL
SODIUM SERPL-SCNC: 141 MMOL/L
WBC # BLD AUTO: 6.55 K/UL

## 2018-11-26 PROCEDURE — 25000003 PHARM REV CODE 250: Performed by: NURSE ANESTHETIST, CERTIFIED REGISTERED

## 2018-11-26 PROCEDURE — 63600175 PHARM REV CODE 636 W HCPCS: Performed by: PHYSICIAN ASSISTANT

## 2018-11-26 PROCEDURE — 63600175 PHARM REV CODE 636 W HCPCS: Performed by: SPECIALIST

## 2018-11-26 PROCEDURE — 11000001 HC ACUTE MED/SURG PRIVATE ROOM

## 2018-11-26 PROCEDURE — G0378 HOSPITAL OBSERVATION PER HR: HCPCS

## 2018-11-26 PROCEDURE — 25000003 PHARM REV CODE 250: Performed by: NURSE PRACTITIONER

## 2018-11-26 PROCEDURE — 88304 TISSUE EXAM BY PATHOLOGIST: CPT | Mod: 26,,, | Performed by: PATHOLOGY

## 2018-11-26 PROCEDURE — 71000033 HC RECOVERY, INTIAL HOUR: Performed by: SPECIALIST

## 2018-11-26 PROCEDURE — 27201423 OPTIME MED/SURG SUP & DEVICES STERILE SUPPLY: Performed by: SPECIALIST

## 2018-11-26 PROCEDURE — C1729 CATH, DRAINAGE: HCPCS | Performed by: SPECIALIST

## 2018-11-26 PROCEDURE — 36415 COLL VENOUS BLD VENIPUNCTURE: CPT

## 2018-11-26 PROCEDURE — 36000708 HC OR TIME LEV III 1ST 15 MIN: Performed by: SPECIALIST

## 2018-11-26 PROCEDURE — 85025 COMPLETE CBC W/AUTO DIFF WBC: CPT

## 2018-11-26 PROCEDURE — 25000003 PHARM REV CODE 250: Performed by: SPECIALIST

## 2018-11-26 PROCEDURE — 83690 ASSAY OF LIPASE: CPT

## 2018-11-26 PROCEDURE — 99226 PR SUBSEQUENT OBSERVATION CARE,LEVEL III: CPT | Mod: ,,, | Performed by: NURSE PRACTITIONER

## 2018-11-26 PROCEDURE — 0FT44ZZ RESECTION OF GALLBLADDER, PERCUTANEOUS ENDOSCOPIC APPROACH: ICD-10-PCS | Performed by: SPECIALIST

## 2018-11-26 PROCEDURE — 99900035 HC TECH TIME PER 15 MIN (STAT)

## 2018-11-26 PROCEDURE — 80053 COMPREHEN METABOLIC PANEL: CPT

## 2018-11-26 PROCEDURE — 37000009 HC ANESTHESIA EA ADD 15 MINS: Performed by: SPECIALIST

## 2018-11-26 PROCEDURE — 37000008 HC ANESTHESIA 1ST 15 MINUTES: Performed by: SPECIALIST

## 2018-11-26 PROCEDURE — 88304 TISSUE EXAM BY PATHOLOGIST: CPT | Performed by: PATHOLOGY

## 2018-11-26 PROCEDURE — 36000709 HC OR TIME LEV III EA ADD 15 MIN: Performed by: SPECIALIST

## 2018-11-26 PROCEDURE — 71000039 HC RECOVERY, EACH ADD'L HOUR: Performed by: SPECIALIST

## 2018-11-26 PROCEDURE — 63600175 PHARM REV CODE 636 W HCPCS: Performed by: NURSE ANESTHETIST, CERTIFIED REGISTERED

## 2018-11-26 PROCEDURE — 94761 N-INVAS EAR/PLS OXIMETRY MLT: CPT

## 2018-11-26 RX ORDER — GLYCOPYRROLATE 0.2 MG/ML
INJECTION INTRAMUSCULAR; INTRAVENOUS
Status: DISCONTINUED | OUTPATIENT
Start: 2018-11-26 | End: 2018-11-26

## 2018-11-26 RX ORDER — KETOROLAC TROMETHAMINE 30 MG/ML
30 INJECTION, SOLUTION INTRAMUSCULAR; INTRAVENOUS ONCE
Status: COMPLETED | OUTPATIENT
Start: 2018-11-26 | End: 2018-11-26

## 2018-11-26 RX ORDER — CEFAZOLIN SODIUM 1 G/3ML
INJECTION, POWDER, FOR SOLUTION INTRAMUSCULAR; INTRAVENOUS
Status: DISCONTINUED | OUTPATIENT
Start: 2018-11-26 | End: 2018-11-26

## 2018-11-26 RX ORDER — BUPIVACAINE HCL/EPINEPHRINE 0.25-.0005
VIAL (ML) INJECTION
Status: DISCONTINUED | OUTPATIENT
Start: 2018-11-26 | End: 2018-11-26 | Stop reason: HOSPADM

## 2018-11-26 RX ORDER — LIDOCAINE HCL/PF 100 MG/5ML
SYRINGE (ML) INTRAVENOUS
Status: DISCONTINUED | OUTPATIENT
Start: 2018-11-26 | End: 2018-11-26

## 2018-11-26 RX ORDER — SIMETHICONE 80 MG
1 TABLET,CHEWABLE ORAL 4 TIMES DAILY PRN
Status: DISCONTINUED | OUTPATIENT
Start: 2018-11-26 | End: 2018-11-30 | Stop reason: HOSPADM

## 2018-11-26 RX ORDER — DEXAMETHASONE SODIUM PHOSPHATE 4 MG/ML
INJECTION, SOLUTION INTRA-ARTICULAR; INTRALESIONAL; INTRAMUSCULAR; INTRAVENOUS; SOFT TISSUE
Status: DISCONTINUED | OUTPATIENT
Start: 2018-11-26 | End: 2018-11-26

## 2018-11-26 RX ORDER — FENTANYL CITRATE 50 UG/ML
INJECTION, SOLUTION INTRAMUSCULAR; INTRAVENOUS
Status: DISCONTINUED | OUTPATIENT
Start: 2018-11-26 | End: 2018-11-26

## 2018-11-26 RX ORDER — ACETAMINOPHEN 10 MG/ML
INJECTION, SOLUTION INTRAVENOUS
Status: DISCONTINUED | OUTPATIENT
Start: 2018-11-26 | End: 2018-11-26

## 2018-11-26 RX ORDER — OXYCODONE AND ACETAMINOPHEN 10; 325 MG/1; MG/1
2 TABLET ORAL EVERY 4 HOURS PRN
Status: DISCONTINUED | OUTPATIENT
Start: 2018-11-26 | End: 2018-11-26

## 2018-11-26 RX ORDER — ONDANSETRON 2 MG/ML
4 INJECTION INTRAMUSCULAR; INTRAVENOUS DAILY PRN
Status: DISCONTINUED | OUTPATIENT
Start: 2018-11-26 | End: 2018-11-26 | Stop reason: HOSPADM

## 2018-11-26 RX ORDER — SODIUM CHLORIDE, SODIUM LACTATE, POTASSIUM CHLORIDE, CALCIUM CHLORIDE 600; 310; 30; 20 MG/100ML; MG/100ML; MG/100ML; MG/100ML
INJECTION, SOLUTION INTRAVENOUS CONTINUOUS PRN
Status: DISCONTINUED | OUTPATIENT
Start: 2018-11-26 | End: 2018-11-26

## 2018-11-26 RX ORDER — MEPERIDINE HYDROCHLORIDE 50 MG/ML
12.5 INJECTION INTRAMUSCULAR; INTRAVENOUS; SUBCUTANEOUS ONCE AS NEEDED
Status: COMPLETED | OUTPATIENT
Start: 2018-11-26 | End: 2018-11-26

## 2018-11-26 RX ORDER — ONDANSETRON HYDROCHLORIDE 2 MG/ML
INJECTION, SOLUTION INTRAMUSCULAR; INTRAVENOUS
Status: DISCONTINUED | OUTPATIENT
Start: 2018-11-26 | End: 2018-11-26

## 2018-11-26 RX ORDER — PROPOFOL 10 MG/ML
VIAL (ML) INTRAVENOUS
Status: DISCONTINUED | OUTPATIENT
Start: 2018-11-26 | End: 2018-11-26

## 2018-11-26 RX ORDER — OXYCODONE HYDROCHLORIDE 5 MG/1
5 TABLET ORAL
Status: DISCONTINUED | OUTPATIENT
Start: 2018-11-26 | End: 2018-11-26 | Stop reason: HOSPADM

## 2018-11-26 RX ORDER — FENTANYL CITRATE 50 UG/ML
25 INJECTION, SOLUTION INTRAMUSCULAR; INTRAVENOUS EVERY 5 MIN PRN
Status: COMPLETED | OUTPATIENT
Start: 2018-11-26 | End: 2018-11-26

## 2018-11-26 RX ORDER — SODIUM CHLORIDE 0.9 % (FLUSH) 0.9 %
3 SYRINGE (ML) INJECTION
Status: DISCONTINUED | OUTPATIENT
Start: 2018-11-26 | End: 2018-11-30 | Stop reason: HOSPADM

## 2018-11-26 RX ORDER — HYDROMORPHONE HYDROCHLORIDE 2 MG/ML
0.4 INJECTION, SOLUTION INTRAMUSCULAR; INTRAVENOUS; SUBCUTANEOUS EVERY 5 MIN PRN
Status: DISCONTINUED | OUTPATIENT
Start: 2018-11-26 | End: 2018-11-26 | Stop reason: HOSPADM

## 2018-11-26 RX ORDER — NEOSTIGMINE METHYLSULFATE 1 MG/ML
INJECTION, SOLUTION INTRAVENOUS
Status: DISCONTINUED | OUTPATIENT
Start: 2018-11-26 | End: 2018-11-26

## 2018-11-26 RX ORDER — OXYCODONE AND ACETAMINOPHEN 7.5; 325 MG/1; MG/1
1 TABLET ORAL EVERY 4 HOURS PRN
Status: DISCONTINUED | OUTPATIENT
Start: 2018-11-26 | End: 2018-11-26

## 2018-11-26 RX ORDER — ROCURONIUM BROMIDE 10 MG/ML
INJECTION, SOLUTION INTRAVENOUS
Status: DISCONTINUED | OUTPATIENT
Start: 2018-11-26 | End: 2018-11-26

## 2018-11-26 RX ORDER — OXYCODONE AND ACETAMINOPHEN 10; 325 MG/1; MG/1
1 TABLET ORAL EVERY 4 HOURS PRN
Status: DISCONTINUED | OUTPATIENT
Start: 2018-11-26 | End: 2018-11-27

## 2018-11-26 RX ORDER — MIDAZOLAM HYDROCHLORIDE 5 MG/ML
INJECTION INTRAMUSCULAR; INTRAVENOUS
Status: DISCONTINUED | OUTPATIENT
Start: 2018-11-26 | End: 2018-11-26

## 2018-11-26 RX ADMIN — SODIUM CHLORIDE, SODIUM LACTATE, POTASSIUM CHLORIDE, AND CALCIUM CHLORIDE: 600; 310; 30; 20 INJECTION, SOLUTION INTRAVENOUS at 05:11

## 2018-11-26 RX ADMIN — MEPERIDINE HYDROCHLORIDE 12.5 MG: 50 INJECTION INTRAMUSCULAR; INTRAVENOUS; SUBCUTANEOUS at 06:11

## 2018-11-26 RX ADMIN — FENTANYL CITRATE 25 MCG: 50 INJECTION, SOLUTION INTRAMUSCULAR; INTRAVENOUS at 06:11

## 2018-11-26 RX ADMIN — SODIUM CHLORIDE, SODIUM LACTATE, POTASSIUM CHLORIDE, AND CALCIUM CHLORIDE: 600; 310; 30; 20 INJECTION, SOLUTION INTRAVENOUS at 03:11

## 2018-11-26 RX ADMIN — FENTANYL CITRATE 25 MCG: 50 INJECTION, SOLUTION INTRAMUSCULAR; INTRAVENOUS at 07:11

## 2018-11-26 RX ADMIN — FENTANYL CITRATE 100 MCG: 50 INJECTION, SOLUTION INTRAMUSCULAR; INTRAVENOUS at 05:11

## 2018-11-26 RX ADMIN — OXYCODONE HYDROCHLORIDE AND ACETAMINOPHEN 1 TABLET: 10; 325 TABLET ORAL at 10:11

## 2018-11-26 RX ADMIN — MIDAZOLAM 2 MG: 5 INJECTION INTRAMUSCULAR; INTRAVENOUS at 04:11

## 2018-11-26 RX ADMIN — GLYCOPYRROLATE 0.6 MG: 0.2 INJECTION, SOLUTION INTRAMUSCULAR; INTRAVENOUS at 05:11

## 2018-11-26 RX ADMIN — ONDANSETRON 4 MG: 2 INJECTION, SOLUTION INTRAMUSCULAR; INTRAVENOUS at 05:11

## 2018-11-26 RX ADMIN — PROPOFOL 200 MG: 10 INJECTION, EMULSION INTRAVENOUS at 05:11

## 2018-11-26 RX ADMIN — SODIUM CHLORIDE: 0.9 INJECTION, SOLUTION INTRAVENOUS at 08:11

## 2018-11-26 RX ADMIN — NEOSTIGMINE METHYLSULFATE 5 MG: 1 INJECTION INTRAVENOUS at 05:11

## 2018-11-26 RX ADMIN — OXYCODONE HYDROCHLORIDE 5 MG: 5 TABLET ORAL at 06:11

## 2018-11-26 RX ADMIN — CEFAZOLIN 2 G: 330 INJECTION, POWDER, FOR SOLUTION INTRAMUSCULAR; INTRAVENOUS at 05:11

## 2018-11-26 RX ADMIN — ACETAMINOPHEN 1000 MG: 10 INJECTION, SOLUTION INTRAVENOUS at 05:11

## 2018-11-26 RX ADMIN — SIMETHICONE CHEW TAB 80 MG 80 MG: 80 TABLET ORAL at 08:11

## 2018-11-26 RX ADMIN — LIDOCAINE HYDROCHLORIDE 60 MG: 20 INJECTION, SOLUTION INTRAVENOUS at 05:11

## 2018-11-26 RX ADMIN — DEXAMETHASONE SODIUM PHOSPHATE 8 MG: 4 INJECTION, SOLUTION INTRAMUSCULAR; INTRAVENOUS at 05:11

## 2018-11-26 RX ADMIN — KETOROLAC TROMETHAMINE 15 MG: 30 INJECTION, SOLUTION INTRAMUSCULAR at 11:11

## 2018-11-26 RX ADMIN — ROCURONIUM BROMIDE 40 MG: 10 INJECTION, SOLUTION INTRAVENOUS at 05:11

## 2018-11-26 RX ADMIN — FENTANYL CITRATE 50 MCG: 50 INJECTION, SOLUTION INTRAMUSCULAR; INTRAVENOUS at 05:11

## 2018-11-26 RX ADMIN — GLYCOPYRROLATE 0.2 MG: 0.2 INJECTION, SOLUTION INTRAMUSCULAR; INTRAVENOUS at 05:11

## 2018-11-26 RX ADMIN — ACETAMINOPHEN 650 MG: 325 TABLET, FILM COATED ORAL at 12:11

## 2018-11-26 RX ADMIN — KETOROLAC TROMETHAMINE 30 MG: 30 INJECTION, SOLUTION INTRAMUSCULAR at 08:11

## 2018-11-26 RX ADMIN — ONDANSETRON 4 MG: 2 INJECTION INTRAMUSCULAR; INTRAVENOUS at 08:11

## 2018-11-26 NOTE — NURSING
Pt rested throughout the night between care. Received pain medication early in shift. Later complained of headache which was resolved with medication and turning temp down in room. New IV still infusing. Pt remains NPO. Boyfriend in room, attentive to pt. He arrived around 0530. Call bell within reach. Bed low and locked. Will continue to monitor.

## 2018-11-26 NOTE — CONSULTS
DATE OF CONSULTATION:  11/25/2018    HISTORY OF PRESENT ILLNESS:  The patient is a 23-year-old female in her normal   state of good health until Wednesday when she began having epigastric and right   upper quadrant pain.  She had nausea associated with this and loose stool.  The   pain was exacerbated by eating.  She has had no previous abdominal operations.    She denies fever, chills, constipation, weight loss, systemic signs of illness.    PAST MEDICAL HISTORY:  The patient is one year status post a normal spontaneous   vaginal delivery of a viable infant.    SOCIAL HISTORY:  The patient does not use tobacco products.  She does not use   alcohol.    REVIEW OF SYSTEMS:  Positive fatigue and anorexia.  No weight loss.  No chest   pain, shortness of breath, cough, sputum production or hemoptysis.  No   palpitations, no orthopnea.  No leg swelling.  Positive bloating.  Positive   upper abdominal pain.  Positive nausea.  Positive loose stool.  No hematuria,   dysuria, urgency, frequency or flank pain.  No motor weakness, tremor or   myalgias.    PHYSICAL EXAMINATION:  GENERAL:  The patient is afebrile.  She is awake, alert and oriented.  HEENT:  She is normocephalic.  Her extraocular movements are intact.    Conjunctivae anicteric.  NECK:  Supple.  Trachea midline.  CHEST:  Clear.  HEART:  Has a regular rate and rhythm.  ABDOMEN:  Soft.  There is direct tenderness in the right upper quadrant and   epigastrium.  There is no rebound.  EXTREMITIES:  There is no motor weakness or tremor.  NEUROLOGIC:  Grossly intact.    Ultrasound of the abdomen showed cholelithiasis with positive Rios sign.  No   evidence of gallbladder wall thickening, hyperemia or pericholecystic fluid.    The patient's hematocrit is 36 with a white blood cell count of 8.13.  The   patient's liver profile and lipase are within normal limits.    IMPRESSION:  Symptomatic gallbladder disease.    PLAN:  Cholecystectomy.      ABI  dd: 11/25/2018  18:17:15 (CST)  td: 11/25/2018 19:24:16 (CST)  Doc ID   #[id number]  Job ID #[id number]    CC:

## 2018-11-26 NOTE — PLAN OF CARE
SW met with pt at bedside to complete discharge assessment, verified PCP and uses [facility].  Pt lives with 2 yo child and significant other.  Pt's significant other, Asif will provide transportation home. No needs identified at this time.     11/26/18 0930   Discharge Assessment   Assessment Type Discharge Planning Assessment   Confirmed/corrected address and phone number on facesheet? Yes   Assessment information obtained from? Patient   Communicated expected length of stay with patient/caregiver no   Prior to hospitilization cognitive status: Alert/Oriented   Prior to hospitalization functional status: Independent   Current cognitive status: Alert/Oriented   Current Functional Status: Independent   Lives With significant other;child(leon), dependent   Able to Return to Prior Arrangements yes   Is patient able to care for self after discharge? Yes   Readmission Within The Last 30 Days no previous admission in last 30 days   Patient currently being followed by outpatient case management? No   Patient currently receives any other outside agency services? No   Equipment Currently Used at Home none   Do you have any problems affording any of your prescribed medications? No   Is the patient taking medications as prescribed? yes   Does the patient have transportation home? Yes   Transportation Available family or friend will provide   Does the patient receive services at the [facility]? No   Discharge Plan A Home   Patient/Family In Agreement With Plan yes

## 2018-11-26 NOTE — SUBJECTIVE & OBJECTIVE
Interval History: continues with right upper quadrant pain; cholecystectomy today per General Surgery    Review of Systems   Constitutional: Positive for activity change, appetite change and fatigue. Negative for chills and fever.   HENT: Negative for congestion and trouble swallowing.    Eyes: Negative for visual disturbance.   Respiratory: Negative for shortness of breath.    Cardiovascular: Negative for chest pain and palpitations.   Gastrointestinal: Positive for abdominal distention, abdominal pain (right side that radiates to back) and nausea. Negative for diarrhea and vomiting.   Endocrine: Negative for cold intolerance and heat intolerance.   Genitourinary: Positive for flank pain. Negative for dysuria, hematuria and urgency.   Skin: Negative.    Neurological: Negative.    Psychiatric/Behavioral: Negative.      Objective:     Vital Signs (Most Recent):  Temp: 98.7 °F (37.1 °C) (11/26/18 1213)  Pulse: 60 (11/26/18 1213)  Resp: 16 (11/26/18 1213)  BP: (!) 86/50 (11/26/18 1213)  SpO2: 98 % (11/26/18 1213) Vital Signs (24h Range):  Temp:  [98 °F (36.7 °C)-99.5 °F (37.5 °C)] 98.7 °F (37.1 °C)  Pulse:  [60-89] 60  Resp:  [16-18] 16  SpO2:  [98 %-100 %] 98 %  BP: ()/(50-61) 86/50     Weight: 84.4 kg (186 lb)  Body mass index is 31.93 kg/m².  No intake or output data in the 24 hours ending 11/26/18 1450   Physical Exam   Constitutional: She is oriented to person, place, and time. She appears well-developed and well-nourished. She appears ill.   HENT:   Head: Normocephalic.   Mouth/Throat: Oropharynx is clear and moist.   Eyes: Conjunctivae and EOM are normal. Pupils are equal, round, and reactive to light.   Neck: Normal range of motion. Neck supple.   Cardiovascular: Normal rate, regular rhythm, normal heart sounds and intact distal pulses.   Pulses:       Dorsalis pedis pulses are 2+ on the right side, and 2+ on the left side.   Pulmonary/Chest: Effort normal and breath sounds normal. No respiratory  distress.   Abdominal: Soft. She exhibits distension. Bowel sounds are decreased. There is tenderness in the right upper quadrant and epigastric area.   Musculoskeletal: Normal range of motion.   Lymphadenopathy:     She has no cervical adenopathy.   Neurological: She is alert and oriented to person, place, and time. She has normal strength.   Skin: Skin is warm and dry.   Psychiatric: She has a normal mood and affect. Her speech is normal and behavior is normal.       Significant Labs:   CBC:   Recent Labs   Lab 11/24/18 2249 11/25/18 0448 11/26/18 0444   WBC 6.67 8.13 6.55   HGB 12.6 12.0 12.6   HCT 37.5 36.1* 37.3    287 275     CMP:   Recent Labs   Lab 11/24/18 2249 11/25/18 0448 11/26/18 0444    140 141   K 3.9 3.7 3.8    108 110   CO2 22* 23 24   GLU 84 91 99   BUN 11 10 9   CREATININE 0.8 0.8 0.8   CALCIUM 9.5 8.9 9.0   PROT 7.2 6.6 6.7   ALBUMIN 3.9 3.6 3.5   BILITOT 0.2 0.4 0.2   ALKPHOS 80 77 84   AST 13 25 13   ALT 14 13 10   ANIONGAP 10 9 7*   EGFRNONAA >60 >60 >60     All pertinent labs within the past 24 hours have been reviewed.    Significant Imaging: I have reviewed all pertinent imaging results/findings within the past 24 hours.

## 2018-11-26 NOTE — PROGRESS NOTES
"[facility] Medicine  Progress Note    Patient Name: Nicolasa Peter  MRN: [id number]  Patient Class: OP- Observation   Admission Date: 11/24/2018  Length of Stay: 0 days  Attending Physician: Song Soni MD  Primary Care Provider: Edgar Vega MD        Subjective:     Principal Problem:Calculus of gallbladder with acute cholecystitis without obstruction    HPI:  Ms. Nicolasa Peter is a 23 year old female with no significant past medical history who presented to the [facility] Department with complaint of worsening epigastric and right upper quadrant abdominal pain.  She reports the pain as "sharp aching" that radiates to right flank is accompanied by abdominal distention.  She reports intermittent nausea without vomiting or diarrhea.  She states as a result that she has decreased appetite and that pain worsens after eating.  She states she has tried Tums and naprosyn without relief.  She states that her symptoms began on Wednesday. She denies fever/ chills, vomiting or diarrhea. Initial work up in the ED with abdominal ultrasound positive for sonographic Rios's sign and evidence of cholelithiasis with concern for cholecystitis.  She will be admitted under Observation for further evaluation and management  of cholelithiasis.          Hospital Course:  Ms. Nicolasa Peter was admitted under Observation for evaluation of right upper quadrant pain.  Ultrasound of the abdomen completed in the ED does show evidence of cholelithiasis with reported positive sonographic Rios's test concerning for acute cholecystitis.  Initial testing, however, did not show signs of infection with urine negative, lipase and liver function tests not elevated, no leukocytosis and the patient has remained afebrile.  General Surgery has been consulted and the patient will undergo cholecystectomy on Monday, November 26, 2018.  Continue intravenous fluid hydration with ana to continue " Zofran 4 mg intravenously every eight hours as needed for nausea and toradol 15 mg intravenously every six hours as needed for moderate            Interval History: continues with right upper quadrant pain; cholecystectomy today per General Surgery    Review of Systems   Constitutional: Positive for activity change, appetite change and fatigue. Negative for chills and fever.   HENT: Negative for congestion and trouble swallowing.    Eyes: Negative for visual disturbance.   Respiratory: Negative for shortness of breath.    Cardiovascular: Negative for chest pain and palpitations.   Gastrointestinal: Positive for abdominal distention, abdominal pain (right side that radiates to back) and nausea. Negative for diarrhea and vomiting.   Endocrine: Negative for cold intolerance and heat intolerance.   Genitourinary: Positive for flank pain. Negative for dysuria, hematuria and urgency.   Skin: Negative.    Neurological: Negative.    Psychiatric/Behavioral: Negative.      Objective:     Vital Signs (Most Recent):  Temp: 98.7 °F (37.1 °C) (11/26/18 1213)  Pulse: 60 (11/26/18 1213)  Resp: 16 (11/26/18 1213)  BP: (!) 86/50 (11/26/18 1213)  SpO2: 98 % (11/26/18 1213) Vital Signs (24h Range):  Temp:  [98 °F (36.7 °C)-99.5 °F (37.5 °C)] 98.7 °F (37.1 °C)  Pulse:  [60-89] 60  Resp:  [16-18] 16  SpO2:  [98 %-100 %] 98 %  BP: ()/(50-61) 86/50     Weight: 84.4 kg (186 lb)  Body mass index is 31.93 kg/m².  No intake or output data in the 24 hours ending 11/26/18 1450   Physical Exam   Constitutional: She is oriented to person, place, and time. She appears well-developed and well-nourished. She appears ill.   HENT:   Head: Normocephalic.   Mouth/Throat: Oropharynx is clear and moist.   Eyes: Conjunctivae and EOM are normal. Pupils are equal, round, and reactive to light.   Neck: Normal range of motion. Neck supple.   Cardiovascular: Normal rate, regular rhythm, normal heart sounds and intact distal pulses.   Pulses:       Dorsalis  pedis pulses are 2+ on the right side, and 2+ on the left side.   Pulmonary/Chest: Effort normal and breath sounds normal. No respiratory distress.   Abdominal: Soft. She exhibits distension. Bowel sounds are decreased. There is tenderness in the right upper quadrant and epigastric area.   Musculoskeletal: Normal range of motion.   Lymphadenopathy:     She has no cervical adenopathy.   Neurological: She is alert and oriented to person, place, and time. She has normal strength.   Skin: Skin is warm and dry.   Psychiatric: She has a normal mood and affect. Her speech is normal and behavior is normal.       Significant Labs:   CBC:   Recent Labs   Lab 11/24/18 2249 11/25/18 0448 11/26/18  0444   WBC 6.67 8.13 6.55   HGB 12.6 12.0 12.6   HCT 37.5 36.1* 37.3    287 275     CMP:   Recent Labs   Lab 11/24/18 2249 11/25/18 0448 11/26/18  0444    140 141   K 3.9 3.7 3.8    108 110   CO2 22* 23 24   GLU 84 91 99   BUN 11 10 9   CREATININE 0.8 0.8 0.8   CALCIUM 9.5 8.9 9.0   PROT 7.2 6.6 6.7   ALBUMIN 3.9 3.6 3.5   BILITOT 0.2 0.4 0.2   ALKPHOS 80 77 84   AST 13 25 13   ALT 14 13 10   ANIONGAP 10 9 7*   EGFRNONAA >60 >60 >60     All pertinent labs within the past 24 hours have been reviewed.    Significant Imaging: I have reviewed all pertinent imaging results/findings within the past 24 hours.    Assessment/Plan:      * Calculus of gallbladder with acute cholecystitis without obstruction    -US abdomen 11/24/2018:  Cholelithiasis; sonographic Rios sign suggesting acute cholecystitis; no secondary signs of gallbladder wall thickening, hyperemia, or pericholecystic fluid seen.  -labs without leukocytosis, lipase normal, liver function tests normal; will monitor with morning labs  -General Surgery consulted and cholecystectomy today  -NPO for now and continue normal saline at 125 cc/hour     Intractable right upper quadrant abdominal pain    -US abdomen 11/24/2018:  Cholelithiasis; sonographic Rios  sign suggesting acute cholecystitis; no secondary signs of gallbladder wall thickening, hyperemia, or pericholecystic fluid seen.  -pain control with toradol 15 mg intravenously every six hours as needed         VTE Risk Mitigation (From admission, onward)        Ordered     Reason for no Mechanical VTE Prophylaxis  Once      11/25/18 0143     IP VTE LOW RISK PATIENT  Once      11/25/18 0143              Lainey Hurley NP  Department of Hospital Medicine   [facility]

## 2018-11-26 NOTE — ANESTHESIA PREPROCEDURE EVALUATION
11/26/2018  Nicolasa Peter is a 23 y.o., female.    Anesthesia Evaluation    I have reviewed the Patient Summary Reports.    I have reviewed the Nursing Notes.   I have reviewed the Medications.     Review of Systems  Anesthesia Hx:  No previous Anesthesia    Social:  Non-Smoker, No Alcohol Use    Hematology/Oncology:  Hematology Normal   Oncology Normal     EENT/Dental:EENT/Dental Normal   Cardiovascular:  Cardiovascular Normal Exercise tolerance: good     Pulmonary:  Pulmonary Normal    Renal/:  Renal/ Normal     Hepatic/GI:  Hepatic/GI Normal    Musculoskeletal:  Musculoskeletal Normal    Neurological:  Neurology Normal    Endocrine:  Endocrine Normal    Dermatological:  Skin Normal    Psych:  Psychiatric Normal           Physical Exam  General:  Obesity    Airway/Jaw/Neck:  Airway Findings: Mouth Opening: Normal Tongue: Normal  General Airway Assessment: Adult  Mallampati: I  TM Distance: Normal, at least 6 cm  Jaw/Neck Findings:  Neck ROM: Normal ROM      Dental:  Dental Findings: In tact        Mental Status:  Mental Status Findings:  Cooperative, Alert and Oriented         Anesthesia Plan  Type of Anesthesia, risks & benefits discussed:  Anesthesia Type:  general  Patient's Preference:   Intra-op Monitoring Plan: standard ASA monitors  Intra-op Monitoring Plan Comments:   Post Op Pain Control Plan: per primary service following discharge from PACU  Post Op Pain Control Plan Comments:   Induction:    Beta Blocker:         Informed Consent: Patient understands risks and agrees with Anesthesia plan.  Questions answered. Anesthesia consent signed with patient.  ASA Score: 2  emergent   Day of Surgery Review of History & Physical:    H&P update referred to the surgeon.         Ready For Surgery From Anesthesia Perspective.

## 2018-11-26 NOTE — ASSESSMENT & PLAN NOTE
-US abdomen 11/24/2018:  Cholelithiasis; sonographic Rios sign suggesting acute cholecystitis; no secondary signs of gallbladder wall thickening, hyperemia, or pericholecystic fluid seen.  -labs without leukocytosis, lipase normal, liver function tests normal; will monitor with morning labs  -General Surgery consulted and cholecystectomy today  -NPO for now and continue normal saline at 125 cc/hour

## 2018-11-26 NOTE — PLAN OF CARE
Problem: Patient Care Overview  Goal: Plan of Care Review  Outcome: Ongoing (interventions implemented as appropriate)  Plan of care reviewed with pt. AAOx4, NAD. Pt denies current pain, n/v/d. Pt slept most of the morning. Pt did experience some abd pain in the early afternoon. Tramadol via IV given. Pt has not c/o pain since given. Pt is aware of plan for surgery in the am. Pt's diet advanced to full liquid then will be NPO after midnight. Pt tolerated dinner well. No n/v noted. IVF infusing. Safety measures implemented. Bed is lowered and locked. Call light is within reach. Will continue to monitor pt.

## 2018-11-26 NOTE — NURSING
Pt decided to unhook her IV and take a shower. Did not call for assistance. During the shower, dressing got wet and IV occluded. Had to restart new IV.

## 2018-11-26 NOTE — OP NOTE
[facility]  Brief Operative Note    SUMMARY     Surgery Date: 11/26/2018     Surgeon(s) and Role:     * Marcio Gonzales Jr., MD - Primary    Assisting Surgeon: None    Pre-op Diagnosis:  Disease of gallbladder [K82.9]    Post-op Diagnosis:  Post-Op Diagnosis Codes:     * Disease of gallbladder [K82.9]    Procedure(s) (LRB):  CHOLECYSTECTOMY, LAPAROSCOPIC (N/A)    Anesthesia: General    Description of Procedure: lap dalton    Description of the findings of the procedure: acute cholecystitis    Estimated Blood Loss: 15cc         Specimens:   Specimen (12h ago, onward)    Start     Ordered    11/26/18 1729  Specimen to Pathology - Surgery  Once     Comments:  1. Gallbladder     Start Status   11/26/18 1729 Collected (11/26/18 1748)       11/26/18 1749

## 2018-11-27 LAB
ALBUMIN SERPL BCP-MCNC: 3.5 G/DL
ALP SERPL-CCNC: 121 U/L
ALT SERPL W/O P-5'-P-CCNC: 802 U/L
ANION GAP SERPL CALC-SCNC: 9 MMOL/L
AST SERPL-CCNC: 1209 U/L
BASOPHILS # BLD AUTO: 0.01 K/UL
BASOPHILS NFR BLD: 0.1 %
BILIRUB SERPL-MCNC: 2.7 MG/DL
BUN SERPL-MCNC: 8 MG/DL
CALCIUM SERPL-MCNC: 8.7 MG/DL
CHLORIDE SERPL-SCNC: 107 MMOL/L
CO2 SERPL-SCNC: 22 MMOL/L
CREAT SERPL-MCNC: 0.8 MG/DL
DIFFERENTIAL METHOD: ABNORMAL
EOSINOPHIL # BLD AUTO: 0 K/UL
EOSINOPHIL NFR BLD: 0 %
ERYTHROCYTE [DISTWIDTH] IN BLOOD BY AUTOMATED COUNT: 13.8 %
EST. GFR  (AFRICAN AMERICAN): >60 ML/MIN/1.73 M^2
EST. GFR  (NON AFRICAN AMERICAN): >60 ML/MIN/1.73 M^2
GLUCOSE SERPL-MCNC: 120 MG/DL
HCT VFR BLD AUTO: 35.9 %
HGB BLD-MCNC: 12.1 G/DL
LYMPHOCYTES # BLD AUTO: 1 K/UL
LYMPHOCYTES NFR BLD: 11.6 %
MCH RBC QN AUTO: 27 PG
MCHC RBC AUTO-ENTMCNC: 33.7 G/DL
MCV RBC AUTO: 80 FL
MONOCYTES # BLD AUTO: 0.8 K/UL
MONOCYTES NFR BLD: 10.1 %
NEUTROPHILS # BLD AUTO: 6.4 K/UL
NEUTROPHILS NFR BLD: 78 %
PLATELET # BLD AUTO: 282 K/UL
PMV BLD AUTO: 11.2 FL
POTASSIUM SERPL-SCNC: 3.6 MMOL/L
PROT SERPL-MCNC: 6.7 G/DL
RBC # BLD AUTO: 4.48 M/UL
SODIUM SERPL-SCNC: 138 MMOL/L
WBC # BLD AUTO: 8.21 K/UL

## 2018-11-27 PROCEDURE — 94761 N-INVAS EAR/PLS OXIMETRY MLT: CPT

## 2018-11-27 PROCEDURE — 80053 COMPREHEN METABOLIC PANEL: CPT

## 2018-11-27 PROCEDURE — 85025 COMPLETE CBC W/AUTO DIFF WBC: CPT

## 2018-11-27 PROCEDURE — 94799 UNLISTED PULMONARY SVC/PX: CPT

## 2018-11-27 PROCEDURE — 25000003 PHARM REV CODE 250: Performed by: PHYSICIAN ASSISTANT

## 2018-11-27 PROCEDURE — 36415 COLL VENOUS BLD VENIPUNCTURE: CPT

## 2018-11-27 PROCEDURE — 63600175 PHARM REV CODE 636 W HCPCS: Performed by: PHYSICIAN ASSISTANT

## 2018-11-27 PROCEDURE — 25000003 PHARM REV CODE 250: Performed by: SPECIALIST

## 2018-11-27 PROCEDURE — 99233 SBSQ HOSP IP/OBS HIGH 50: CPT | Mod: ,,, | Performed by: PHYSICIAN ASSISTANT

## 2018-11-27 PROCEDURE — 25000003 PHARM REV CODE 250: Performed by: NURSE PRACTITIONER

## 2018-11-27 PROCEDURE — 11000001 HC ACUTE MED/SURG PRIVATE ROOM

## 2018-11-27 PROCEDURE — G0378 HOSPITAL OBSERVATION PER HR: HCPCS

## 2018-11-27 RX ORDER — OXYCODONE HYDROCHLORIDE 5 MG/1
5 TABLET ORAL EVERY 6 HOURS PRN
Status: DISCONTINUED | OUTPATIENT
Start: 2018-11-28 | End: 2018-11-28

## 2018-11-27 RX ORDER — OXYCODONE HYDROCHLORIDE 5 MG/1
10 TABLET ORAL EVERY 6 HOURS PRN
Status: DISCONTINUED | OUTPATIENT
Start: 2018-11-27 | End: 2018-11-28

## 2018-11-27 RX ADMIN — ONDANSETRON 4 MG: 2 INJECTION INTRAMUSCULAR; INTRAVENOUS at 05:11

## 2018-11-27 RX ADMIN — KETOROLAC TROMETHAMINE 15 MG: 30 INJECTION, SOLUTION INTRAMUSCULAR at 08:11

## 2018-11-27 RX ADMIN — OXYCODONE HYDROCHLORIDE AND ACETAMINOPHEN 1 TABLET: 10; 325 TABLET ORAL at 02:11

## 2018-11-27 RX ADMIN — SIMETHICONE CHEW TAB 80 MG 80 MG: 80 TABLET ORAL at 05:11

## 2018-11-27 RX ADMIN — KETOROLAC TROMETHAMINE 15 MG: 30 INJECTION, SOLUTION INTRAMUSCULAR at 07:11

## 2018-11-27 RX ADMIN — OXYCODONE HYDROCHLORIDE AND ACETAMINOPHEN 1 TABLET: 10; 325 TABLET ORAL at 08:11

## 2018-11-27 RX ADMIN — SIMETHICONE CHEW TAB 80 MG 80 MG: 80 TABLET ORAL at 08:11

## 2018-11-27 RX ADMIN — PROMETHAZINE HYDROCHLORIDE 6.25 MG: 25 INJECTION INTRAMUSCULAR; INTRAVENOUS at 08:11

## 2018-11-27 RX ADMIN — ACETAMINOPHEN 650 MG: 325 TABLET, FILM COATED ORAL at 08:11

## 2018-11-27 RX ADMIN — OXYCODONE HYDROCHLORIDE AND ACETAMINOPHEN 1 TABLET: 10; 325 TABLET ORAL at 11:11

## 2018-11-27 RX ADMIN — OXYCODONE HYDROCHLORIDE 10 MG: 5 TABLET ORAL at 05:11

## 2018-11-27 RX ADMIN — SODIUM CHLORIDE: 0.9 INJECTION, SOLUTION INTRAVENOUS at 08:11

## 2018-11-27 NOTE — SUBJECTIVE & OBJECTIVE
Interval History: c/o episode vomiting this am after eating [employer]    Review of Systems   Constitutional: Negative for activity change, appetite change, chills, fatigue and fever.   HENT: Negative for congestion and trouble swallowing.    Eyes: Negative for visual disturbance.   Respiratory: Negative for shortness of breath.    Cardiovascular: Negative for chest pain and palpitations.   Gastrointestinal: Positive for abdominal pain and nausea. Negative for abdominal distention, diarrhea and vomiting.   Endocrine: Negative for cold intolerance and heat intolerance.   Genitourinary: Negative for dysuria, flank pain, hematuria and urgency.   Skin: Negative.    Neurological: Negative.    Psychiatric/Behavioral: Negative.      Objective:     Vital Signs (Most Recent):  Temp: 98.2 °F (36.8 °C) (11/27/18 1155)  Pulse: 72 (11/27/18 1155)  Resp: 18 (11/27/18 1155)  BP: 119/64 (11/27/18 1155)  SpO2: 98 % (11/27/18 1155) Vital Signs (24h Range):  Temp:  [96.8 °F (36 °C)-98.3 °F (36.8 °C)] 98.2 °F (36.8 °C)  Pulse:  [71-89] 72  Resp:  [18-22] 18  SpO2:  [98 %-100 %] 98 %  BP: (102-129)/(52-82) 119/64     Weight: 89.3 kg (196 lb 13.9 oz)  Body mass index is 33.79 kg/m².    Intake/Output Summary (Last 24 hours) at 11/27/2018 1444  Last data filed at 11/27/2018 1100  Gross per 24 hour   Intake 2898.75 ml   Output 1450 ml   Net 1448.75 ml      Physical Exam   Constitutional: She is oriented to person, place, and time. She appears well-developed and well-nourished. No distress.   HENT:   Head: Normocephalic and atraumatic.   Eyes: Conjunctivae are normal. Right eye exhibits no discharge. Left eye exhibits no discharge. No scleral icterus.   Neck: Normal range of motion. Neck supple.   Cardiovascular: Normal rate, regular rhythm, normal heart sounds and intact distal pulses.   Pulmonary/Chest: Effort normal and breath sounds normal. No stridor. No respiratory distress.   Abdominal: Soft. Bowel sounds are normal. She exhibits no  distension. There is tenderness (TTP at incision sites). There is no rebound and no guarding.   Musculoskeletal: Normal range of motion. She exhibits no edema, tenderness or deformity.   Neurological: She is alert and oriented to person, place, and time. No cranial nerve deficit.   Skin: Skin is warm and dry. Capillary refill takes less than 2 seconds. She is not diaphoretic.   Psychiatric: She has a normal mood and affect.   Nursing note and vitals reviewed.      Significant Labs:   CBC:   Recent Labs   Lab 11/26/18 0444 11/27/18 0616   WBC 6.55 8.21   HGB 12.6 12.1   HCT 37.3 35.9*    282     CMP:   Recent Labs   Lab 11/26/18 0444 11/27/18 0616    138   K 3.8 3.6    107   CO2 24 22*   GLU 99 120*   BUN 9 8   CREATININE 0.8 0.8   CALCIUM 9.0 8.7   PROT 6.7 6.7   ALBUMIN 3.5 3.5   BILITOT 0.2 2.7*   ALKPHOS 84 121   AST 13 1,209*   ALT 10 802*   ANIONGAP 7* 9   EGFRNONAA >60 >60     All pertinent labs within the past 24 hours have been reviewed.    Significant Imaging: I have reviewed all pertinent imaging results/findings within the past 24 hours.   Imaging Results          US Abdomen Limited (Final result)  Result time 11/24/18 23:37:55    Final result by Josie Paredes MD (11/24/18 23:37:55)                 Impression:      1. Cholelithiasis.  Sonographic Rios sign is reportedly positive per ultrasonographer which would suggest acute cholecystitis, however there are no secondary signs of gallbladder wall thickening, hyperemia, or pericholecystic fluid seen.  2. Mild hepatomegaly.      Electronically signed by: Josie Paredes MD  Date:    11/24/2018  Time:    23:37             Narrative:    EXAMINATION:  US ABDOMEN LIMITED    CLINICAL HISTORY:  RUQ/epigastric abdominal pain;    TECHNIQUE:  Limited ultrasound of the right upper quadrant of the abdomen (including pancreas, liver, gallbladder, common bile duct, and right kidney) was performed.    COMPARISON:  None.    FINDINGS:  The liver  is mildly enlarged measuring 18.4cm.  Hepatic parenchyma is homogeneous without evidence for masses.  No intra- or extrahepatic biliary ductal dilatation. The common bile duct measures 0.5 cm.  The gallbladder demonstrates multiple mobile stones.  No evidence of gallbladder wall thickening, hyperemia, or pericholecystic fluid.  Sonographic Rios's sign is reportedly positive.  The visualized portions of the pancreas, IVC, and aorta appear normal. The right kidney measures 11.2 cm. No ascites.

## 2018-11-27 NOTE — OR NURSING
Pt sleeping comfortably,.  Face very relaxed.  When aroused and asks to rate her pain, she states she still hurts.  Explained that she is going to have pain and that she will not be pain free.  States her pain is an 8 and falls asleepl when unstimulated.

## 2018-11-27 NOTE — ASSESSMENT & PLAN NOTE
-US abdomen 11/24/2018:  Cholelithiasis; sonographic Rios sign suggesting acute cholecystitis; no secondary signs of gallbladder wall thickening, hyperemia, or pericholecystic fluid seen.

## 2018-11-27 NOTE — TRANSFER OF CARE
"Anesthesia Transfer of Care Note    Patient: Nicolasa Peter    Procedure(s) Performed: Procedure(s) (LRB):  CHOLECYSTECTOMY, LAPAROSCOPIC (N/A)    Patient location: PACU    Anesthesia Type: general    Transport from OR: Transported from OR on 2-3 L/min O2 by NC with adequate spontaneous ventilation    Post pain: adequate analgesia    Post assessment: no apparent anesthetic complications and tolerated procedure well    Post vital signs: stable    Level of consciousness: awake and alert    Nausea/Vomiting: no nausea/vomiting    Complications: none    Transfer of care protocol was followed      Last vitals:   Visit Vitals  BP (!) 86/50 (BP Location: Left arm, Patient Position: Lying)   Pulse 60   Temp 37.1 °C (98.7 °F) (Oral)   Resp 16   Ht 5' 4" (1.626 m)   Wt 84.4 kg (186 lb)   LMP 11/25/2018 (Exact Date)   SpO2 98%   Breastfeeding? No   BMI 31.93 kg/m²     "

## 2018-11-27 NOTE — OP NOTE
DATE OF PROCEDURE:  11/26/2018    PREOPERATIVE DIAGNOSIS:  Symptomatic gallbladder disease.    POSTOPERATIVE DIAGNOSES:  Acute cholecystitis, cholelithiasis.    OPERATIVE PROCEDURE:  Laparoscopic cholecystectomy.    PROCEDURE IN DETAIL:  The patient was brought to the Operating Room, placed in   supine position.  The abdomen prepped and draped in a sterile fashion.  A small   incision was made at the umbilicus.  Veress needle inserted and pneumoperitoneum   achieved.  A 10-mm Optiview trocar inserted at the umbilicus under direct   observation.  Three 5-mm trocars were inserted, one in the upper midline, two in   the right upper quadrant.  Gallbladder visualized, seemed to be acutely   inflamed.  Using blunt dissection, adhesions to the gallbladder were taken down,   exposing the cystic artery and duct.  These were doubly clamped proximally then   transected.  Then using electrocautery Bovie, the gallbladder was removed from   the liver bed.  Under direct observation using an EndoCatch, the gallbladder was   removed through the periumbilical port.  Peritoneal cavity irrigated and   inspected.  The pneumoperitoneum released.  Fascia of the umbilicus closed with   0 Vicryl, the skin with running 4-0 Monocryl.  The patient tolerated the   procedure well and left the Operating Room in good condition.      ABI  dd: 11/26/2018 18:00:55 (CST)  td: 11/26/2018 18:43:41 (CST)  Doc ID   #[id number]  Job ID #[id number]    CC:

## 2018-11-27 NOTE — ANESTHESIA POSTPROCEDURE EVALUATION
"Anesthesia Post Evaluation    Patient: Nicolasa Peter    Procedure(s) Performed: Procedure(s) (LRB):  CHOLECYSTECTOMY, LAPAROSCOPIC (N/A)    Final Anesthesia Type: general  Patient location during evaluation: PACU  Patient participation: Yes- Able to Participate  Level of consciousness: awake and alert  Post-procedure vital signs: reviewed and stable  Pain management: adequate  Airway patency: patent  PONV status at discharge: No PONV  Anesthetic complications: no      Cardiovascular status: blood pressure returned to baseline  Respiratory status: unassisted and room air  Hydration status: euvolemic  Follow-up not needed.        Visit Vitals  BP (!) 109/52 (BP Location: Left arm, Patient Position: Lying)   Pulse 89   Temp 36.7 °C (98.1 °F) (Oral)   Resp (!) 22   Ht 5' 4" (1.626 m)   Wt 84.4 kg (186 lb)   LMP 11/25/2018 (Exact Date)   SpO2 100%   Breastfeeding? No   BMI 31.93 kg/m²       Pain/Yaron Score: Pain Assessment Performed: Yes (11/26/2018  3:00 PM)  Presence of Pain: denies (11/26/2018  3:00 PM)  Pain Rating Prior to Med Admin: 8 (11/26/2018 11:12 AM)  Pain Rating Post Med Admin: 2 (11/26/2018  1:00 PM)        "

## 2018-11-27 NOTE — PLAN OF CARE
Problem: Patient Care Overview  Goal: Plan of Care Review  Outcome: Ongoing (interventions implemented as appropriate)  Pt received on room air, unable to perform IS due to pain. Will continue to monitor.

## 2018-11-27 NOTE — PROGRESS NOTES
avss  Vomited this am after eating [employer]    PE  Abd--soft, incisional tenderness, wounds clean and dry    Imp  S/p dalton for cholecystitis    Plan  Serial exam

## 2018-11-27 NOTE — PROGRESS NOTES
"[facility]  Hospital Medicine  Progress Note    Patient Name: Nicolasa Peter  MRN: [id number]  Patient Class: OP- Observation   Admission Date: 11/24/2018  Length of Stay: 0 days  Attending Physician: Marcio Patterson MD  Primary Care Provider: Edgar Vega MD        Subjective:     Principal Problem:Calculus of gallbladder with acute cholecystitis without obstruction    HPI:  Ms. Nicolasa Peter is a 23 year old female with no significant past medical history who presented to the [facility] with complaint of worsening epigastric and right upper quadrant abdominal pain.  She reports the pain as "sharp aching" that radiates to right flank is accompanied by abdominal distention.  She reports intermittent nausea without vomiting or diarrhea.  She states as a result that she has decreased appetite and that pain worsens after eating.  She states she has tried Tums and naprosyn without relief.  She states that her symptoms began on Wednesday. She denies fever/ chills, vomiting or diarrhea. Initial work up in the ED with abdominal ultrasound positive for sonographic Rios's sign and evidence of cholelithiasis with concern for cholecystitis.  She will be admitted under Observation for further evaluation and management  of cholelithiasis.          Hospital Course:  24 y/o female admitted with RUQ pain. Ultrasound of the abdomen completed in the ED showed evidence of cholelithiasis with reported positive sonographic Rios's test concerning for acute cholecystitis.  General Surgery consulted; s/p  cholecystectomy on Monday, November 26, 2018.  Liver function tests elevated post op      Interval History: c/o episode vomiting this am after eating Pope[place]    Review of Systems   Constitutional: Negative for activity change, appetite change, chills, fatigue and fever.   HENT: Negative for congestion and trouble swallowing.    Eyes: Negative for visual disturbance.   Respiratory: " Negative for shortness of breath.    Cardiovascular: Negative for chest pain and palpitations.   Gastrointestinal: Positive for abdominal pain and nausea. Negative for abdominal distention, diarrhea and vomiting.   Endocrine: Negative for cold intolerance and heat intolerance.   Genitourinary: Negative for dysuria, flank pain, hematuria and urgency.   Skin: Negative.    Neurological: Negative.    Psychiatric/Behavioral: Negative.      Objective:     Vital Signs (Most Recent):  Temp: 98.2 °F (36.8 °C) (11/27/18 1155)  Pulse: 72 (11/27/18 1155)  Resp: 18 (11/27/18 1155)  BP: 119/64 (11/27/18 1155)  SpO2: 98 % (11/27/18 1155) Vital Signs (24h Range):  Temp:  [96.8 °F (36 °C)-98.3 °F (36.8 °C)] 98.2 °F (36.8 °C)  Pulse:  [71-89] 72  Resp:  [18-22] 18  SpO2:  [98 %-100 %] 98 %  BP: (102-129)/(52-82) 119/64     Weight: 89.3 kg (196 lb 13.9 oz)  Body mass index is 33.79 kg/m².    Intake/Output Summary (Last 24 hours) at 11/27/2018 1444  Last data filed at 11/27/2018 1100  Gross per 24 hour   Intake 2898.75 ml   Output 1450 ml   Net 1448.75 ml      Physical Exam   Constitutional: She is oriented to person, place, and time. She appears well-developed and well-nourished. No distress.   HENT:   Head: Normocephalic and atraumatic.   Eyes: Conjunctivae are normal. Right eye exhibits no discharge. Left eye exhibits no discharge. No scleral icterus.   Neck: Normal range of motion. Neck supple.   Cardiovascular: Normal rate, regular rhythm, normal heart sounds and intact distal pulses.   Pulmonary/Chest: Effort normal and breath sounds normal. No stridor. No respiratory distress.   Abdominal: Soft. Bowel sounds are normal. She exhibits no distension. There is tenderness (TTP at incision sites). There is no rebound and no guarding.   Musculoskeletal: Normal range of motion. She exhibits no edema, tenderness or deformity.   Neurological: She is alert and oriented to person, place, and time. No cranial nerve deficit.   Skin: Skin is  warm and dry. Capillary refill takes less than 2 seconds. She is not diaphoretic.   Psychiatric: She has a normal mood and affect.   Nursing note and vitals reviewed.      Significant Labs:   CBC:   Recent Labs   Lab 11/26/18 0444 11/27/18 0616   WBC 6.55 8.21   HGB 12.6 12.1   HCT 37.3 35.9*    282     CMP:   Recent Labs   Lab 11/26/18 0444 11/27/18 0616    138   K 3.8 3.6    107   CO2 24 22*   GLU 99 120*   BUN 9 8   CREATININE 0.8 0.8   CALCIUM 9.0 8.7   PROT 6.7 6.7   ALBUMIN 3.5 3.5   BILITOT 0.2 2.7*   ALKPHOS 84 121   AST 13 1,209*   ALT 10 802*   ANIONGAP 7* 9   EGFRNONAA >60 >60     All pertinent labs within the past 24 hours have been reviewed.    Significant Imaging: I have reviewed all pertinent imaging results/findings within the past 24 hours.   Imaging Results          US Abdomen Limited (Final result)  Result time 11/24/18 23:37:55    Final result by Josie Paredes MD (11/24/18 23:37:55)                 Impression:      1. Cholelithiasis.  Sonographic Rios sign is reportedly positive per ultrasonographer which would suggest acute cholecystitis, however there are no secondary signs of gallbladder wall thickening, hyperemia, or pericholecystic fluid seen.  2. Mild hepatomegaly.      Electronically signed by: Josie Paredes MD  Date:    11/24/2018  Time:    23:37             Narrative:    EXAMINATION:  US ABDOMEN LIMITED    CLINICAL HISTORY:  RUQ/epigastric abdominal pain;    TECHNIQUE:  Limited ultrasound of the right upper quadrant of the abdomen (including pancreas, liver, gallbladder, common bile duct, and right kidney) was performed.    COMPARISON:  None.    FINDINGS:  The liver is mildly enlarged measuring 18.4cm.  Hepatic parenchyma is homogeneous without evidence for masses.  No intra- or extrahepatic biliary ductal dilatation. The common bile duct measures 0.5 cm.  The gallbladder demonstrates multiple mobile stones.  No evidence of gallbladder wall thickening,  hyperemia, or pericholecystic fluid.  Sonographic Rios's sign is reportedly positive.  The visualized portions of the pancreas, IVC, and aorta appear normal. The right kidney measures 11.2 cm. No ascites.                                  Assessment/Plan:      * Calculus of gallbladder with acute cholecystitis without obstruction    - US abdomen 11/24/2018:  Cholelithiasis; sonographic Rios sign suggesting acute cholecystitis; no secondary signs of gallbladder wall thickening, hyperemia, or pericholecystic fluid seen.  - s/p lap dalton 11/26/2018  - regular diet  - elevated liver function tests will monitor with morning labs  - d/w General Surgery         Intractable right upper quadrant abdominal pain    -US abdomen 11/24/2018:  Cholelithiasis; sonographic Rios sign suggesting acute cholecystitis; no secondary signs of gallbladder wall thickening, hyperemia, or pericholecystic fluid seen.            VTE Risk Mitigation (From admission, onward)        Ordered     Reason for no Mechanical VTE Prophylaxis  Once      11/25/18 0143     IP VTE LOW RISK PATIENT  Once      11/25/18 0143              ALEX NailsC  Department of Hospital Medicine   [facility]

## 2018-11-27 NOTE — PLAN OF CARE
Problem: Patient Care Overview  Goal: Plan of Care Review  Outcome: Ongoing (interventions implemented as appropriate)  Pt instructed on plan of care. Verbalized understanding of plan of care. Encouraged to ambulate in [place].

## 2018-11-27 NOTE — ASSESSMENT & PLAN NOTE
- US abdomen 11/24/2018:  Cholelithiasis; sonographic Rios sign suggesting acute cholecystitis; no secondary signs of gallbladder wall thickening, hyperemia, or pericholecystic fluid seen.  - s/p lap dalton 11/26/2018  - regular diet  - elevated liver function tests will monitor with morning labs  - d/w General Surgery

## 2018-11-28 PROBLEM — R79.89 ELEVATED LFTS: Status: ACTIVE | Noted: 2018-11-28

## 2018-11-28 PROBLEM — E80.6 HYPERBILIRUBINEMIA: Status: ACTIVE | Noted: 2018-11-28

## 2018-11-28 LAB
ALBUMIN SERPL BCP-MCNC: 3.8 G/DL
ALP SERPL-CCNC: 165 U/L
ALT SERPL W/O P-5'-P-CCNC: 565 U/L
ANION GAP SERPL CALC-SCNC: 8 MMOL/L
AST SERPL-CCNC: 249 U/L
BACTERIA #/AREA URNS HPF: ABNORMAL /HPF
BASOPHILS # BLD AUTO: 0.02 K/UL
BASOPHILS NFR BLD: 0.2 %
BILIRUB SERPL-MCNC: 3.8 MG/DL
BILIRUB UR QL STRIP: ABNORMAL
BUN SERPL-MCNC: 5 MG/DL
CALCIUM SERPL-MCNC: 8.8 MG/DL
CHLORIDE SERPL-SCNC: 108 MMOL/L
CLARITY UR: CLEAR
CO2 SERPL-SCNC: 23 MMOL/L
COLOR UR: YELLOW
CREAT SERPL-MCNC: 0.8 MG/DL
DIFFERENTIAL METHOD: ABNORMAL
EOSINOPHIL # BLD AUTO: 0.1 K/UL
EOSINOPHIL NFR BLD: 0.7 %
ERYTHROCYTE [DISTWIDTH] IN BLOOD BY AUTOMATED COUNT: 14.1 %
EST. GFR  (AFRICAN AMERICAN): >60 ML/MIN/1.73 M^2
EST. GFR  (NON AFRICAN AMERICAN): >60 ML/MIN/1.73 M^2
GLUCOSE SERPL-MCNC: 90 MG/DL
GLUCOSE UR QL STRIP: NEGATIVE
HCT VFR BLD AUTO: 39.8 %
HGB BLD-MCNC: 13.2 G/DL
HGB UR QL STRIP: ABNORMAL
KETONES UR QL STRIP: ABNORMAL
LEUKOCYTE ESTERASE UR QL STRIP: ABNORMAL
LYMPHOCYTES # BLD AUTO: 1.6 K/UL
LYMPHOCYTES NFR BLD: 19.5 %
MCH RBC QN AUTO: 26.8 PG
MCHC RBC AUTO-ENTMCNC: 33.2 G/DL
MCV RBC AUTO: 81 FL
MICROSCOPIC COMMENT: ABNORMAL
MONOCYTES # BLD AUTO: 1 K/UL
MONOCYTES NFR BLD: 12.2 %
NEUTROPHILS # BLD AUTO: 5.4 K/UL
NEUTROPHILS NFR BLD: 67.3 %
NITRITE UR QL STRIP: NEGATIVE
PH UR STRIP: 7 [PH] (ref 5–8)
PLATELET # BLD AUTO: 250 K/UL
PMV BLD AUTO: 10.8 FL
POTASSIUM SERPL-SCNC: 3.5 MMOL/L
PROT SERPL-MCNC: 7.2 G/DL
PROT UR QL STRIP: NEGATIVE
RBC # BLD AUTO: 4.92 M/UL
RBC #/AREA URNS HPF: 1 /HPF (ref 0–4)
SODIUM SERPL-SCNC: 139 MMOL/L
SP GR UR STRIP: 1.01 (ref 1–1.03)
URN SPEC COLLECT METH UR: ABNORMAL
UROBILINOGEN UR STRIP-ACNC: NEGATIVE EU/DL
WBC # BLD AUTO: 8.01 K/UL
WBC #/AREA URNS HPF: 5 /HPF (ref 0–5)

## 2018-11-28 PROCEDURE — 36415 COLL VENOUS BLD VENIPUNCTURE: CPT

## 2018-11-28 PROCEDURE — 94761 N-INVAS EAR/PLS OXIMETRY MLT: CPT

## 2018-11-28 PROCEDURE — 81000 URINALYSIS NONAUTO W/SCOPE: CPT

## 2018-11-28 PROCEDURE — 80053 COMPREHEN METABOLIC PANEL: CPT

## 2018-11-28 PROCEDURE — 25000003 PHARM REV CODE 250: Performed by: SPECIALIST

## 2018-11-28 PROCEDURE — 85025 COMPLETE CBC W/AUTO DIFF WBC: CPT

## 2018-11-28 PROCEDURE — 99900035 HC TECH TIME PER 15 MIN (STAT)

## 2018-11-28 PROCEDURE — A9585 GADOBUTROL INJECTION: HCPCS | Performed by: HOSPITALIST

## 2018-11-28 PROCEDURE — 99233 SBSQ HOSP IP/OBS HIGH 50: CPT | Mod: ,,, | Performed by: PHYSICIAN ASSISTANT

## 2018-11-28 PROCEDURE — 11000001 HC ACUTE MED/SURG PRIVATE ROOM

## 2018-11-28 PROCEDURE — 25500020 PHARM REV CODE 255: Performed by: HOSPITALIST

## 2018-11-28 PROCEDURE — 25000003 PHARM REV CODE 250: Performed by: PHYSICIAN ASSISTANT

## 2018-11-28 PROCEDURE — 63600175 PHARM REV CODE 636 W HCPCS: Performed by: SPECIALIST

## 2018-11-28 PROCEDURE — 63600175 PHARM REV CODE 636 W HCPCS: Performed by: PHYSICIAN ASSISTANT

## 2018-11-28 PROCEDURE — 25000003 PHARM REV CODE 250: Performed by: NURSE PRACTITIONER

## 2018-11-28 RX ORDER — ACETAMINOPHEN 325 MG/1
650 TABLET ORAL EVERY 8 HOURS PRN
Status: DISCONTINUED | OUTPATIENT
Start: 2018-11-28 | End: 2018-11-30 | Stop reason: HOSPADM

## 2018-11-28 RX ORDER — GADOBUTROL 604.72 MG/ML
9 INJECTION INTRAVENOUS
Status: COMPLETED | OUTPATIENT
Start: 2018-11-28 | End: 2018-11-28

## 2018-11-28 RX ORDER — OXYCODONE HYDROCHLORIDE 5 MG/1
10 TABLET ORAL EVERY 6 HOURS PRN
Status: DISCONTINUED | OUTPATIENT
Start: 2018-11-28 | End: 2018-11-30

## 2018-11-28 RX ORDER — OXYCODONE HYDROCHLORIDE 5 MG/1
5 TABLET ORAL EVERY 6 HOURS PRN
Status: DISCONTINUED | OUTPATIENT
Start: 2018-11-28 | End: 2018-11-30 | Stop reason: HOSPADM

## 2018-11-28 RX ORDER — RAMELTEON 8 MG/1
8 TABLET ORAL NIGHTLY PRN
Status: DISCONTINUED | OUTPATIENT
Start: 2018-11-28 | End: 2018-11-28

## 2018-11-28 RX ORDER — OXYCODONE HYDROCHLORIDE 5 MG/1
10 TABLET ORAL ONCE
Status: COMPLETED | OUTPATIENT
Start: 2018-11-28 | End: 2018-11-28

## 2018-11-28 RX ORDER — DIPHENHYDRAMINE HCL 25 MG
25 CAPSULE ORAL NIGHTLY PRN
Status: DISCONTINUED | OUTPATIENT
Start: 2018-11-28 | End: 2018-11-30 | Stop reason: HOSPADM

## 2018-11-28 RX ORDER — HYDROMORPHONE HYDROCHLORIDE 1 MG/ML
0.5 INJECTION, SOLUTION INTRAMUSCULAR; INTRAVENOUS; SUBCUTANEOUS
Status: DISCONTINUED | OUTPATIENT
Start: 2018-11-28 | End: 2018-11-30 | Stop reason: HOSPADM

## 2018-11-28 RX ADMIN — SIMETHICONE CHEW TAB 80 MG 80 MG: 80 TABLET ORAL at 05:11

## 2018-11-28 RX ADMIN — HYDROMORPHONE HYDROCHLORIDE 0.5 MG: 1 INJECTION, SOLUTION INTRAMUSCULAR; INTRAVENOUS; SUBCUTANEOUS at 05:11

## 2018-11-28 RX ADMIN — OXYCODONE HYDROCHLORIDE 10 MG: 5 TABLET ORAL at 05:11

## 2018-11-28 RX ADMIN — HYDROMORPHONE HYDROCHLORIDE 0.5 MG: 1 INJECTION, SOLUTION INTRAMUSCULAR; INTRAVENOUS; SUBCUTANEOUS at 03:11

## 2018-11-28 RX ADMIN — GADOBUTROL 9 ML: 604.72 INJECTION INTRAVENOUS at 02:11

## 2018-11-28 RX ADMIN — SODIUM CHLORIDE: 0.9 INJECTION, SOLUTION INTRAVENOUS at 10:11

## 2018-11-28 RX ADMIN — ONDANSETRON 4 MG: 2 INJECTION INTRAMUSCULAR; INTRAVENOUS at 06:11

## 2018-11-28 RX ADMIN — OXYCODONE HYDROCHLORIDE 10 MG: 5 TABLET ORAL at 09:11

## 2018-11-28 RX ADMIN — PROMETHAZINE HYDROCHLORIDE 6.25 MG: 25 INJECTION INTRAMUSCULAR; INTRAVENOUS at 07:11

## 2018-11-28 RX ADMIN — OXYCODONE HYDROCHLORIDE 10 MG: 5 TABLET ORAL at 12:11

## 2018-11-28 RX ADMIN — SODIUM CHLORIDE: 0.9 INJECTION, SOLUTION INTRAVENOUS at 09:11

## 2018-11-28 RX ADMIN — HYDROMORPHONE HYDROCHLORIDE 0.5 MG: 1 INJECTION, SOLUTION INTRAMUSCULAR; INTRAVENOUS; SUBCUTANEOUS at 09:11

## 2018-11-28 RX ADMIN — HYDROMORPHONE HYDROCHLORIDE 0.5 MG: 1 INJECTION, SOLUTION INTRAMUSCULAR; INTRAVENOUS; SUBCUTANEOUS at 12:11

## 2018-11-28 NOTE — PLAN OF CARE
Problem: Patient Care Overview  Goal: Plan of Care Review  Outcome: Ongoing (interventions implemented as appropriate)  Pt on room air, SpO2 97%. IS done.

## 2018-11-28 NOTE — PLAN OF CARE
6:46 PM  Appears Asleep in bed at this time.  VSS on RA and afebrile this shift.  Tolerating pain per MAR- APAP DCd RT elevated LFT.  Good appetite, Emesis x4 and good UOP this shift, independent BRP.  Repositions self independently in bed and ambulating around room.         Free from injury or skin breakdown; Fall precautions maintained and call light in reach.  POC updated questions answered and comments acknowledged.  Purposeful hourly rounding completed this shift.      Encouraging Ambulatiion

## 2018-11-28 NOTE — SUBJECTIVE & OBJECTIVE
Interval History: c/o N/V, worsening abd pain    Review of Systems   Constitutional: Negative for activity change, appetite change, chills, fatigue and fever.   HENT: Negative for congestion and trouble swallowing.    Eyes: Negative for visual disturbance.   Respiratory: Negative for shortness of breath.    Cardiovascular: Negative for chest pain and palpitations.   Gastrointestinal: Positive for abdominal pain, nausea and vomiting. Negative for abdominal distention and diarrhea.   Endocrine: Negative for cold intolerance and heat intolerance.   Genitourinary: Negative for dysuria, flank pain, hematuria and urgency.   Skin: Negative.    Neurological: Negative.    Psychiatric/Behavioral: Negative.      Objective:     Vital Signs (Most Recent):  Temp: 97.7 °F (36.5 °C) (11/28/18 0745)  Pulse: 80 (11/28/18 0745)  Resp: 18 (11/28/18 0745)  BP: 128/72 (11/28/18 0745)  SpO2: 99 % (11/28/18 0745) Vital Signs (24h Range):  Temp:  [97.7 °F (36.5 °C)-98.8 °F (37.1 °C)] 97.7 °F (36.5 °C)  Pulse:  [63-87] 80  Resp:  [16-20] 18  SpO2:  [97 %-100 %] 99 %  BP: (113-128)/(62-72) 128/72     Weight: 89.3 kg (196 lb 13.9 oz)  Body mass index is 33.79 kg/m².    Intake/Output Summary (Last 24 hours) at 11/28/2018 1014  Last data filed at 11/28/2018 0625  Gross per 24 hour   Intake 2079.58 ml   Output 1100 ml   Net 979.58 ml      Physical Exam   Constitutional: She is oriented to person, place, and time. She appears well-developed and well-nourished. No distress.   HENT:   Head: Normocephalic and atraumatic.   Eyes: Conjunctivae are normal. Right eye exhibits no discharge. Left eye exhibits no discharge. No scleral icterus.   Neck: Normal range of motion. Neck supple.   Cardiovascular: Normal rate, regular rhythm, normal heart sounds and intact distal pulses.   Pulmonary/Chest: Effort normal and breath sounds normal. No stridor. No respiratory distress.   Abdominal: Soft. Bowel sounds are normal. She exhibits no distension. There is  tenderness (diffuse TTP). There is no rebound and no guarding.   Musculoskeletal: Normal range of motion. She exhibits no edema, tenderness or deformity.   Neurological: She is alert and oriented to person, place, and time. No cranial nerve deficit.   Skin: Skin is warm and dry. Capillary refill takes less than 2 seconds. She is not diaphoretic.   Psychiatric: She has a normal mood and affect.   Nursing note and vitals reviewed.      Significant Labs:   CBC:   Recent Labs   Lab 11/27/18  0616 11/28/18  0529   WBC 8.21 8.01   HGB 12.1 13.2   HCT 35.9* 39.8    250     CMP:   Recent Labs   Lab 11/27/18  0616 11/28/18  0529    139   K 3.6 3.5    108   CO2 22* 23   * 90   BUN 8 5*   CREATININE 0.8 0.8   CALCIUM 8.7 8.8   PROT 6.7 7.2   ALBUMIN 3.5 3.8   BILITOT 2.7* 3.8*   ALKPHOS 121 165*   AST 1,209* 249*   * 565*   ANIONGAP 9 8   EGFRNONAA >60 >60     Urine Studies:   Recent Labs   Lab 11/28/18  0750   COLORU Yellow   APPEARANCEUA Clear   PHUR 7.0   SPECGRAV 1.010   PROTEINUA Negative   GLUCUA Negative   KETONESU Trace*   BILIRUBINUA 2+*   OCCULTUA Trace*   NITRITE Negative   UROBILINOGEN Negative   LEUKOCYTESUR 1+*   RBCUA 1   WBCUA 5   BACTERIA Few*     All pertinent labs within the past 24 hours have been reviewed.    Significant Imaging: I have reviewed all pertinent imaging results/findings within the past 24 hours.   Imaging Results          US Abdomen Limited (Final result)  Result time 11/24/18 23:37:55    Final result by Josie Praedes MD (11/24/18 23:37:55)                 Impression:      1. Cholelithiasis.  Sonographic Rios sign is reportedly positive per ultrasonographer which would suggest acute cholecystitis, however there are no secondary signs of gallbladder wall thickening, hyperemia, or pericholecystic fluid seen.  2. Mild hepatomegaly.      Electronically signed by: Josie Paredes MD  Date:    11/24/2018  Time:    23:37             Narrative:     EXAMINATION:  US ABDOMEN LIMITED    CLINICAL HISTORY:  RUQ/epigastric abdominal pain;    TECHNIQUE:  Limited ultrasound of the right upper quadrant of the abdomen (including pancreas, liver, gallbladder, common bile duct, and right kidney) was performed.    COMPARISON:  None.    FINDINGS:  The liver is mildly enlarged measuring 18.4cm.  Hepatic parenchyma is homogeneous without evidence for masses.  No intra- or extrahepatic biliary ductal dilatation. The common bile duct measures 0.5 cm.  The gallbladder demonstrates multiple mobile stones.  No evidence of gallbladder wall thickening, hyperemia, or pericholecystic fluid.  Sonographic Rios's sign is reportedly positive.  The visualized portions of the pancreas, IVC, and aorta appear normal. The right kidney measures 11.2 cm. No ascites.

## 2018-11-28 NOTE — PLAN OF CARE
Problem: Patient Care Overview  Goal: Plan of Care Review  Outcome: Ongoing (interventions implemented as appropriate)  Plan of care reviewed with patient and all questions answer. Pt remains free from fall, injury, and skin breakdown. Pt neurovascular checks are intact. Positions self independently. Patient complaints of constant pain to abdomen and back. Patient pain mildly controlled by PRN pain medication. Patient had vomited x 1 this shift, and complaints of nausea, PRN medication given. Patient has concerns about her care and request a UA and CTscan, Isabella Larios PA-C was notified of the patients concerns. Urinalysis with reflex culture was order. Patient educated on the collection of the UA and to notified the nurse when she gets the urge to urinate. Patient verbalized understanding. Purposeful rounding done. Patient has call light within reach, bed brakes lock, side rails up x2, and bed in low position.

## 2018-11-28 NOTE — CONSULTS
[facility]  Gastroenterology  Consult Note    Patient Name: Nicolasa Peter  MRN: [id number]  Admission Date: 11/24/2018  Hospital Length of Stay: 0 days  Code Status: Full Code   Attending Provider:   Consulting Provider: Tony Tuttle MD  Primary Care Physician: Edgar Vega MD  Principal Problem:Calculus of gallbladder with acute cholecystitis without obstruction    Inpatient consult to Gastroenterology  Consult performed by: Tony Tuttle MD  Consult ordered by: Niecy Gregory PA-C  Reason for consult: dilated CBD        Subjective:alert no acute distress     HPI: This lady experienced upper abdominal sharp severe pain over the past several days prior to the diagnosis of acute cholecystitis followed by surgery. The pain came in waves, and exacerbated with any oral intake. There was associated nausea and vomiting but no fever or chills. She has had weight loss. After her GB removal she had meal induced nausea and emesis and labs showed a dramatically elevated AST and ALT with a rising bilirubin. AN US was repeated showing dilated ducts. As such GI was consulted and pt will have MRCP this afternoon. No family history of liver disease or colon cancer. No use of tobacco or alcohol. She has been on NSAID's for pain that was not helpful.    History reviewed. No pertinent past medical history.    Past Surgical History:   Procedure Laterality Date    CHOLECYSTECTOMY, LAPAROSCOPIC (ADD ON) N/A 11/26/2018    Performed by Marcio Gonzales Jr., MD at [facility] OR    LAPAROSCOPIC CHOLECYSTECTOMY N/A 11/26/2018    Procedure: CHOLECYSTECTOMY, LAPAROSCOPIC (ADD ON);  Surgeon: Marcio Gonzales Jr., MD;  Location: [facility] OR;  Service: General;  Laterality: N/A;  (ADD ON)       Review of patient's allergies indicates:  No Known Allergies  Family History     None        Tobacco Use    Smoking status: Never Smoker   Substance and Sexual Activity    Alcohol use: No    Drug use: No    Sexual activity: Yes      Partners: Male     Review of Systems   Constitutional: Positive for activity change, appetite change, diaphoresis and fatigue.   HENT: Negative for congestion, dental problem, ear pain and sinus pain.    Eyes: Negative for pain, discharge and itching.   Respiratory: Negative for apnea, chest tightness, wheezing and stridor.    Cardiovascular: Negative for chest pain, palpitations and leg swelling.   Gastrointestinal: Positive for abdominal distention, abdominal pain, nausea and vomiting. Negative for anal bleeding, blood in stool, constipation, diarrhea and rectal pain.   Endocrine: Negative for cold intolerance, heat intolerance and polydipsia.   Genitourinary: Negative for flank pain and frequency.   Musculoskeletal: Negative.  Negative for arthralgias, back pain and gait problem.   Allergic/Immunologic: Negative for environmental allergies, food allergies and immunocompromised state.   Neurological: Negative.  Negative for dizziness and light-headedness.   Hematological: Negative.  Negative for adenopathy. Does not bruise/bleed easily.   Psychiatric/Behavioral: Negative.  Negative for agitation, behavioral problems and confusion.          Vital Signs (Most Recent):  Temp: 98.6 °F (37 °C) (11/28/18 1207)  Pulse: 70 (11/28/18 1207)  Resp: 18 (11/28/18 1207)  BP: (!) 105/54 (11/28/18 1207)  SpO2: 100 % (11/28/18 1207) Vital Signs (24h Range):  Temp:  [97.7 °F (36.5 °C)-98.8 °F (37.1 °C)] 98.6 °F (37 °C)  Pulse:  [63-87] 70  Resp:  [16-20] 18  SpO2:  [97 %-100 %] 100 %  BP: (105-128)/(54-72) 105/54     Weight: 89.3 kg (196 lb 13.9 oz) (11/26/18 2006)  Body mass index is 33.79 kg/m².      Intake/Output Summary (Last 24 hours) at 11/28/2018 1428  Last data filed at 11/28/2018 0625  Gross per 24 hour   Intake 1892.08 ml   Output 300 ml   Net 1592.08 ml       Lines/Drains/Airways     Peripheral Intravenous Line                 Peripheral IV - Single Lumen 11/26/18 0312 Anterior;Right Hand 2 days                Physical  Exam   Constitutional: She is oriented to person, place, and time. She appears well-developed and well-nourished. No distress.   HENT:   Head: Normocephalic and atraumatic.   Mouth/Throat: No oropharyngeal exudate.   Eyes: EOM are normal. Pupils are equal, round, and reactive to light. Scleral icterus is present.   Neck: Normal range of motion. Neck supple. No JVD present.   Cardiovascular: Normal rate and regular rhythm. Exam reveals no gallop and no friction rub.   No murmur heard.  Pulmonary/Chest: Effort normal and breath sounds normal. No respiratory distress. She has no wheezes. She exhibits no tenderness.   Abdominal: Soft. Bowel sounds are normal. She exhibits distension. She exhibits no mass. There is tenderness. There is no rebound and no guarding. No hernia.   Musculoskeletal: Normal range of motion. She exhibits no edema, tenderness or deformity.   Neurological: She is alert and oriented to person, place, and time. No cranial nerve deficit.   Skin: Skin is warm and dry. She is not diaphoretic. No pallor.   Psychiatric: She has a normal mood and affect. Her behavior is normal. Judgment and thought content normal.       Significant Labs:  Elevated AST>>ALT, total bilirubin 3.8, alkaline phosphatase elevated    Significant Imaging:  Reviewed US and shows a dilated CBD, new since exam few days earlier    Assessment/Plan: This lady had a lap cholecystectomy and she experienced nausea and emesis and abnormal liver studies that led to US showing new onset dilated CBD and intrahepatic ducts. She is set for an MRCP and if positive for retained CBD stone will set up ERCP. Await results from this.     Active Diagnoses:    Diagnosis Date Noted POA    PRINCIPAL PROBLEM:  Calculus of gallbladder with acute cholecystitis without obstruction [K80.00] 11/24/2018 Yes    Hyperbilirubinemia [E80.6] 11/28/2018 No    Elevated LFTs [R94.5] 11/28/2018 No    Calculus of gallbladder without cholecystitis without obstruction  [K80.20]  Yes    Intractable right upper quadrant abdominal pain [R10.11] 11/25/2018 Yes      Problems Resolved During this Admission:           Thank you for your consult.     Tony Tuttle MD  Gastroenterology  [facility]

## 2018-11-28 NOTE — NURSING
"Patient request to not be disturb. Patient stated that she was asleep and was not in pain, until the tech came in to due her vital signs. Patient stated that she does not want her vital signs done at 4am because she wants to sleep. Education provided about the important of getting her vital signs, patient still refuse and ask the nurse to put a sign on the outside of her door that states "do not distrurb". Sign placed on the outside of patient door.   "

## 2018-11-28 NOTE — NURSING
04:50-Patient complaints of pain 10/10 to abdomen. Isabella Larios PA-C notified, new telephone order given, Oxycodone immediate release tablet 10mg po once. Telephone order read back to Isabella.    05:07-Patient request to speak with Isabella. Isabella notified.    05:11-Oxycodone immediate release tablet 10mg given as order.    05:36-New telephone order given by Isabella Larios PA-C. Urinalysis, reflex to urine culture. Telephone order read back to Isabella Larios PA-C.    06:07-Isabella Larios PA-C at the bedside, patient in bathroom sitting on the toilet voicing her concerns.

## 2018-11-28 NOTE — PROGRESS NOTES
"[facility]  Hospital Medicine  Progress Note    Patient Name: Nicolasa Peter  MRN: [id number]  Patient Class: IP- Inpatient   Admission Date: 11/24/2018  Length of Stay: 0 days  Attending Physician: Marcio Patterson MD  Primary Care Provider: Edgar Vega MD        Subjective:     Principal Problem:Calculus of gallbladder with acute cholecystitis without obstruction    HPI:  Ms. Nicolasa Peter is a 23 year old female with no significant past medical history who presented to the [facility] with complaint of worsening epigastric and right upper quadrant abdominal pain.  She reports the pain as "sharp aching" that radiates to right flank is accompanied by abdominal distention.  She reports intermittent nausea without vomiting or diarrhea.  She states as a result that she has decreased appetite and that pain worsens after eating.  She states she has tried Tums and naprosyn without relief.  She states that her symptoms began on Wednesday. She denies fever/ chills, vomiting or diarrhea. Initial work up in the ED with abdominal ultrasound positive for sonographic Rios's sign and evidence of cholelithiasis with concern for cholecystitis.  She will be admitted under Observation for further evaluation and management  of cholelithiasis.          Hospital Course:  22 y/o female admitted with RUQ pain. Ultrasound of the abdomen completed in the ED showed evidence of cholelithiasis with reported positive sonographic Rios's test concerning for acute cholecystitis.  General Surgery consulted; s/p  cholecystectomy on Monday, November 26, 2018.  Hospital course noteable for elevated Liver function tests post operatively associated with N/V, abdominal pain    Interval History: c/o N/V, worsening abd pain    Review of Systems   Constitutional: Negative for activity change, appetite change, chills, fatigue and fever.   HENT: Negative for congestion and trouble swallowing.    Eyes: " Negative for visual disturbance.   Respiratory: Negative for shortness of breath.    Cardiovascular: Negative for chest pain and palpitations.   Gastrointestinal: Positive for abdominal pain, nausea and vomiting. Negative for abdominal distention and diarrhea.   Endocrine: Negative for cold intolerance and heat intolerance.   Genitourinary: Negative for dysuria, flank pain, hematuria and urgency.   Skin: Negative.    Neurological: Negative.    Psychiatric/Behavioral: Negative.      Objective:     Vital Signs (Most Recent):  Temp: 97.7 °F (36.5 °C) (11/28/18 0745)  Pulse: 80 (11/28/18 0745)  Resp: 18 (11/28/18 0745)  BP: 128/72 (11/28/18 0745)  SpO2: 99 % (11/28/18 0745) Vital Signs (24h Range):  Temp:  [97.7 °F (36.5 °C)-98.8 °F (37.1 °C)] 97.7 °F (36.5 °C)  Pulse:  [63-87] 80  Resp:  [16-20] 18  SpO2:  [97 %-100 %] 99 %  BP: (113-128)/(62-72) 128/72     Weight: 89.3 kg (196 lb 13.9 oz)  Body mass index is 33.79 kg/m².    Intake/Output Summary (Last 24 hours) at 11/28/2018 1014  Last data filed at 11/28/2018 0625  Gross per 24 hour   Intake 2079.58 ml   Output 1100 ml   Net 979.58 ml      Physical Exam   Constitutional: She is oriented to person, place, and time. She appears well-developed and well-nourished. No distress.   HENT:   Head: Normocephalic and atraumatic.   Eyes: Conjunctivae are normal. Right eye exhibits no discharge. Left eye exhibits no discharge. No scleral icterus.   Neck: Normal range of motion. Neck supple.   Cardiovascular: Normal rate, regular rhythm, normal heart sounds and intact distal pulses.   Pulmonary/Chest: Effort normal and breath sounds normal. No stridor. No respiratory distress.   Abdominal: Soft. Bowel sounds are normal. She exhibits no distension. There is tenderness (diffuse TTP). There is no rebound and no guarding.   Musculoskeletal: Normal range of motion. She exhibits no edema, tenderness or deformity.   Neurological: She is alert and oriented to person, place, and time. No  cranial nerve deficit.   Skin: Skin is warm and dry. Capillary refill takes less than 2 seconds. She is not diaphoretic.   Psychiatric: She has a normal mood and affect.   Nursing note and vitals reviewed.      Significant Labs:   CBC:   Recent Labs   Lab 11/27/18  0616 11/28/18  0529   WBC 8.21 8.01   HGB 12.1 13.2   HCT 35.9* 39.8    250     CMP:   Recent Labs   Lab 11/27/18  0616 11/28/18  0529    139   K 3.6 3.5    108   CO2 22* 23   * 90   BUN 8 5*   CREATININE 0.8 0.8   CALCIUM 8.7 8.8   PROT 6.7 7.2   ALBUMIN 3.5 3.8   BILITOT 2.7* 3.8*   ALKPHOS 121 165*   AST 1,209* 249*   * 565*   ANIONGAP 9 8   EGFRNONAA >60 >60     Urine Studies:   Recent Labs   Lab 11/28/18  0750   COLORU Yellow   APPEARANCEUA Clear   PHUR 7.0   SPECGRAV 1.010   PROTEINUA Negative   GLUCUA Negative   KETONESU Trace*   BILIRUBINUA 2+*   OCCULTUA Trace*   NITRITE Negative   UROBILINOGEN Negative   LEUKOCYTESUR 1+*   RBCUA 1   WBCUA 5   BACTERIA Few*     All pertinent labs within the past 24 hours have been reviewed.    Significant Imaging: I have reviewed all pertinent imaging results/findings within the past 24 hours.   Imaging Results          US Abdomen Limited (Final result)  Result time 11/24/18 23:37:55    Final result by Josie Paredes MD (11/24/18 23:37:55)                 Impression:      1. Cholelithiasis.  Sonographic Rios sign is reportedly positive per ultrasonographer which would suggest acute cholecystitis, however there are no secondary signs of gallbladder wall thickening, hyperemia, or pericholecystic fluid seen.  2. Mild hepatomegaly.      Electronically signed by: Josie Paredes MD  Date:    11/24/2018  Time:    23:37             Narrative:    EXAMINATION:  US ABDOMEN LIMITED    CLINICAL HISTORY:  RUQ/epigastric abdominal pain;    TECHNIQUE:  Limited ultrasound of the right upper quadrant of the abdomen (including pancreas, liver, gallbladder, common bile duct, and right kidney)  was performed.    COMPARISON:  None.    FINDINGS:  The liver is mildly enlarged measuring 18.4cm.  Hepatic parenchyma is homogeneous without evidence for masses.  No intra- or extrahepatic biliary ductal dilatation. The common bile duct measures 0.5 cm.  The gallbladder demonstrates multiple mobile stones.  No evidence of gallbladder wall thickening, hyperemia, or pericholecystic fluid.  Sonographic Rios's sign is reportedly positive.  The visualized portions of the pancreas, IVC, and aorta appear normal. The right kidney measures 11.2 cm. No ascites.                                  Assessment/Plan:      * Calculus of gallbladder with acute cholecystitis without obstruction    - US abdomen 11/24/2018:  Cholelithiasis; sonographic Iros sign suggesting acute cholecystitis; no secondary signs of gallbladder wall thickening, hyperemia, or pericholecystic fluid seen.  - s/p lap dalton 11/26/2018 now with elevated liver function tests; bili rising; patient with continued pain, N/V unable to tolerate po  - MRCP needed, MRI down till 4pm, check stat US abd eval CBD for possible retained stone, will consult GI for possible ERCP based on US findings  - d/w General Surgery         Elevated LFTs    - see above       Hyperbilirubinemia    - see above       Intractable right upper quadrant abdominal pain    -US abdomen 11/24/2018:  Cholelithiasis; sonographic Rios sign suggesting acute cholecystitis; no secondary signs of gallbladder wall thickening, hyperemia, or pericholecystic fluid seen.            VTE Risk Mitigation (From admission, onward)        Ordered     Reason for no Mechanical VTE Prophylaxis  Once      11/25/18 0143     IP VTE LOW RISK PATIENT  Once      11/25/18 0143              Niecy Gregory PA-C  Department of Hospital Medicine   [facility]

## 2018-11-28 NOTE — ASSESSMENT & PLAN NOTE
- US abdomen 11/24/2018:  Cholelithiasis; sonographic Rios sign suggesting acute cholecystitis; no secondary signs of gallbladder wall thickening, hyperemia, or pericholecystic fluid seen.  - s/p lap dalton 11/26/2018 now with elevated liver function tests; bili rising; patient with continued pain, N/V unable to tolerate po  - MRCP needed, MRI down till 4pm, check stat US abd eval CBD for possible retained stone, will consult GI for possible ERCP based on US findings  - d/w General Surgery

## 2018-11-28 NOTE — PLAN OF CARE
Problem: Patient Care Overview  Goal: Plan of Care Review  Outcome: Ongoing (interventions implemented as appropriate)  Pt on RA. No distress noted. Will continue to monitor.

## 2018-11-29 ENCOUNTER — ANESTHESIA (OUTPATIENT)
Dept: ENDOSCOPY | Facility: OTHER | Age: 23
DRG: 419 | End: 2018-11-29
Payer: MEDICAID

## 2018-11-29 ENCOUNTER — ANESTHESIA EVENT (OUTPATIENT)
Dept: ENDOSCOPY | Facility: OTHER | Age: 23
DRG: 419 | End: 2018-11-29
Payer: MEDICAID

## 2018-11-29 LAB
ALBUMIN SERPL BCP-MCNC: 3.3 G/DL
ALP SERPL-CCNC: 180 U/L
ALT SERPL W/O P-5'-P-CCNC: 348 U/L
ANION GAP SERPL CALC-SCNC: 13 MMOL/L
AST SERPL-CCNC: 87 U/L
BASOPHILS # BLD AUTO: 0.03 K/UL
BASOPHILS NFR BLD: 0.4 %
BILIRUB SERPL-MCNC: 1.8 MG/DL
BUN SERPL-MCNC: 6 MG/DL
CALCIUM SERPL-MCNC: 9 MG/DL
CHLORIDE SERPL-SCNC: 106 MMOL/L
CO2 SERPL-SCNC: 20 MMOL/L
CREAT SERPL-MCNC: 0.7 MG/DL
DIFFERENTIAL METHOD: ABNORMAL
EOSINOPHIL # BLD AUTO: 0 K/UL
EOSINOPHIL NFR BLD: 0.6 %
ERYTHROCYTE [DISTWIDTH] IN BLOOD BY AUTOMATED COUNT: 14.2 %
EST. GFR  (AFRICAN AMERICAN): >60 ML/MIN/1.73 M^2
EST. GFR  (NON AFRICAN AMERICAN): >60 ML/MIN/1.73 M^2
GLUCOSE SERPL-MCNC: 73 MG/DL
HCT VFR BLD AUTO: 37.7 %
HGB BLD-MCNC: 12.7 G/DL
LYMPHOCYTES # BLD AUTO: 1.7 K/UL
LYMPHOCYTES NFR BLD: 25.5 %
MCH RBC QN AUTO: 27.1 PG
MCHC RBC AUTO-ENTMCNC: 33.7 G/DL
MCV RBC AUTO: 80 FL
MONOCYTES # BLD AUTO: 0.6 K/UL
MONOCYTES NFR BLD: 8.1 %
NEUTROPHILS # BLD AUTO: 4.4 K/UL
NEUTROPHILS NFR BLD: 65.3 %
PLATELET # BLD AUTO: 259 K/UL
PMV BLD AUTO: 11.2 FL
POTASSIUM SERPL-SCNC: 3.6 MMOL/L
PROT SERPL-MCNC: 7 G/DL
RBC # BLD AUTO: 4.69 M/UL
SODIUM SERPL-SCNC: 139 MMOL/L
WBC # BLD AUTO: 6.79 K/UL

## 2018-11-29 PROCEDURE — 99900035 HC TECH TIME PER 15 MIN (STAT)

## 2018-11-29 PROCEDURE — 27200976 HC DILATOR, BILIARY: Performed by: INTERNAL MEDICINE

## 2018-11-29 PROCEDURE — 25000003 PHARM REV CODE 250: Performed by: INTERNAL MEDICINE

## 2018-11-29 PROCEDURE — 25000003 PHARM REV CODE 250: Performed by: NURSE PRACTITIONER

## 2018-11-29 PROCEDURE — 37000008 HC ANESTHESIA 1ST 15 MINUTES: Performed by: INTERNAL MEDICINE

## 2018-11-29 PROCEDURE — 94761 N-INVAS EAR/PLS OXIMETRY MLT: CPT

## 2018-11-29 PROCEDURE — 27201674 HC SPHINCTERTOME: Performed by: INTERNAL MEDICINE

## 2018-11-29 PROCEDURE — 0F798ZZ DILATION OF COMMON BILE DUCT, VIA NATURAL OR ARTIFICIAL OPENING ENDOSCOPIC: ICD-10-PCS | Performed by: INTERNAL MEDICINE

## 2018-11-29 PROCEDURE — 37000009 HC ANESTHESIA EA ADD 15 MINS: Performed by: INTERNAL MEDICINE

## 2018-11-29 PROCEDURE — 43262 ENDO CHOLANGIOPANCREATOGRAPH: CPT | Performed by: INTERNAL MEDICINE

## 2018-11-29 PROCEDURE — 85025 COMPLETE CBC W/AUTO DIFF WBC: CPT

## 2018-11-29 PROCEDURE — C1769 GUIDE WIRE: HCPCS | Performed by: INTERNAL MEDICINE

## 2018-11-29 PROCEDURE — 25000003 PHARM REV CODE 250: Performed by: NURSE ANESTHETIST, CERTIFIED REGISTERED

## 2018-11-29 PROCEDURE — 63600175 PHARM REV CODE 636 W HCPCS: Performed by: NURSE ANESTHETIST, CERTIFIED REGISTERED

## 2018-11-29 PROCEDURE — 94799 UNLISTED PULMONARY SVC/PX: CPT

## 2018-11-29 PROCEDURE — 99233 SBSQ HOSP IP/OBS HIGH 50: CPT | Mod: ,,, | Performed by: PHYSICIAN ASSISTANT

## 2018-11-29 PROCEDURE — C9113 INJ PANTOPRAZOLE SODIUM, VIA: HCPCS | Performed by: PHYSICIAN ASSISTANT

## 2018-11-29 PROCEDURE — 11000001 HC ACUTE MED/SURG PRIVATE ROOM

## 2018-11-29 PROCEDURE — 80053 COMPREHEN METABOLIC PANEL: CPT

## 2018-11-29 PROCEDURE — 63600175 PHARM REV CODE 636 W HCPCS: Performed by: SPECIALIST

## 2018-11-29 PROCEDURE — 36415 COLL VENOUS BLD VENIPUNCTURE: CPT

## 2018-11-29 PROCEDURE — 63600175 PHARM REV CODE 636 W HCPCS: Performed by: PHYSICIAN ASSISTANT

## 2018-11-29 RX ORDER — SODIUM CHLORIDE, SODIUM LACTATE, POTASSIUM CHLORIDE, CALCIUM CHLORIDE 600; 310; 30; 20 MG/100ML; MG/100ML; MG/100ML; MG/100ML
INJECTION, SOLUTION INTRAVENOUS CONTINUOUS
Status: DISCONTINUED | OUTPATIENT
Start: 2018-11-29 | End: 2018-11-30 | Stop reason: HOSPADM

## 2018-11-29 RX ORDER — MEPERIDINE HYDROCHLORIDE 50 MG/ML
12.5 INJECTION INTRAMUSCULAR; INTRAVENOUS; SUBCUTANEOUS ONCE AS NEEDED
Status: ACTIVE | OUTPATIENT
Start: 2018-11-29 | End: 2018-11-29

## 2018-11-29 RX ORDER — ONDANSETRON HYDROCHLORIDE 2 MG/ML
INJECTION, SOLUTION INTRAMUSCULAR; INTRAVENOUS
Status: DISCONTINUED | OUTPATIENT
Start: 2018-11-29 | End: 2018-11-29

## 2018-11-29 RX ORDER — MIDAZOLAM HYDROCHLORIDE 1 MG/ML
INJECTION INTRAMUSCULAR; INTRAVENOUS
Status: DISCONTINUED | OUTPATIENT
Start: 2018-11-29 | End: 2018-11-29

## 2018-11-29 RX ORDER — CIPROFLOXACIN 2 MG/ML
INJECTION, SOLUTION INTRAVENOUS
Status: DISCONTINUED | OUTPATIENT
Start: 2018-11-29 | End: 2018-11-29

## 2018-11-29 RX ORDER — FENTANYL CITRATE 50 UG/ML
25 INJECTION, SOLUTION INTRAMUSCULAR; INTRAVENOUS EVERY 5 MIN PRN
Status: DISCONTINUED | OUTPATIENT
Start: 2018-11-29 | End: 2018-11-30

## 2018-11-29 RX ORDER — SODIUM CHLORIDE 0.9 % (FLUSH) 0.9 %
3 SYRINGE (ML) INJECTION
Status: DISCONTINUED | OUTPATIENT
Start: 2018-11-29 | End: 2018-11-30 | Stop reason: HOSPADM

## 2018-11-29 RX ORDER — LIDOCAINE HCL/PF 100 MG/5ML
SYRINGE (ML) INTRAVENOUS
Status: DISCONTINUED | OUTPATIENT
Start: 2018-11-29 | End: 2018-11-29

## 2018-11-29 RX ORDER — OXYCODONE HYDROCHLORIDE 5 MG/1
5 TABLET ORAL
Status: DISCONTINUED | OUTPATIENT
Start: 2018-11-29 | End: 2018-11-30

## 2018-11-29 RX ORDER — ONDANSETRON 2 MG/ML
4 INJECTION INTRAMUSCULAR; INTRAVENOUS DAILY PRN
Status: DISCONTINUED | OUTPATIENT
Start: 2018-11-29 | End: 2018-11-30

## 2018-11-29 RX ORDER — DEXAMETHASONE SODIUM PHOSPHATE 4 MG/ML
INJECTION, SOLUTION INTRA-ARTICULAR; INTRALESIONAL; INTRAMUSCULAR; INTRAVENOUS; SOFT TISSUE
Status: DISCONTINUED | OUTPATIENT
Start: 2018-11-29 | End: 2018-11-29

## 2018-11-29 RX ORDER — ROCURONIUM BROMIDE 10 MG/ML
INJECTION, SOLUTION INTRAVENOUS
Status: DISCONTINUED | OUTPATIENT
Start: 2018-11-29 | End: 2018-11-29

## 2018-11-29 RX ORDER — GLUCAGON 1 MG
KIT INJECTION
Status: DISCONTINUED | OUTPATIENT
Start: 2018-11-29 | End: 2018-11-29

## 2018-11-29 RX ORDER — FENTANYL CITRATE 50 UG/ML
INJECTION, SOLUTION INTRAMUSCULAR; INTRAVENOUS
Status: DISCONTINUED | OUTPATIENT
Start: 2018-11-29 | End: 2018-11-29

## 2018-11-29 RX ORDER — PANTOPRAZOLE SODIUM 40 MG/10ML
40 INJECTION, POWDER, LYOPHILIZED, FOR SOLUTION INTRAVENOUS DAILY
Status: DISCONTINUED | OUTPATIENT
Start: 2018-11-29 | End: 2018-11-30 | Stop reason: HOSPADM

## 2018-11-29 RX ORDER — PROPOFOL 10 MG/ML
VIAL (ML) INTRAVENOUS
Status: DISCONTINUED | OUTPATIENT
Start: 2018-11-29 | End: 2018-11-29

## 2018-11-29 RX ADMIN — PROPOFOL 200 MG: 10 INJECTION, EMULSION INTRAVENOUS at 11:11

## 2018-11-29 RX ADMIN — HYDROMORPHONE HYDROCHLORIDE 0.5 MG: 1 INJECTION, SOLUTION INTRAMUSCULAR; INTRAVENOUS; SUBCUTANEOUS at 08:11

## 2018-11-29 RX ADMIN — ROCURONIUM BROMIDE 30 MG: 10 INJECTION, SOLUTION INTRAVENOUS at 11:11

## 2018-11-29 RX ADMIN — MIDAZOLAM HYDROCHLORIDE 2 MG: 1 INJECTION, SOLUTION INTRAMUSCULAR; INTRAVENOUS at 11:11

## 2018-11-29 RX ADMIN — FENTANYL CITRATE 100 MCG: 50 INJECTION, SOLUTION INTRAMUSCULAR; INTRAVENOUS at 11:11

## 2018-11-29 RX ADMIN — CARBOXYMETHYLCELLULOSE SODIUM 2 DROP: 2.5 SOLUTION/ DROPS OPHTHALMIC at 11:11

## 2018-11-29 RX ADMIN — SODIUM CHLORIDE: 0.9 INJECTION, SOLUTION INTRAVENOUS at 04:11

## 2018-11-29 RX ADMIN — GLUCAGON 0.5 MG: 1 INJECTION, POWDER, LYOPHILIZED, FOR SOLUTION INTRAMUSCULAR; INTRAVENOUS at 11:11

## 2018-11-29 RX ADMIN — SODIUM CHLORIDE, SODIUM LACTATE, POTASSIUM CHLORIDE, AND CALCIUM CHLORIDE: 600; 310; 30; 20 INJECTION, SOLUTION INTRAVENOUS at 11:11

## 2018-11-29 RX ADMIN — PANTOPRAZOLE SODIUM 40 MG: 40 INJECTION, POWDER, FOR SOLUTION INTRAVENOUS at 03:11

## 2018-11-29 RX ADMIN — ONDANSETRON 4 MG: 2 INJECTION, SOLUTION INTRAMUSCULAR; INTRAVENOUS at 11:11

## 2018-11-29 RX ADMIN — HYDROMORPHONE HYDROCHLORIDE 0.5 MG: 1 INJECTION, SOLUTION INTRAMUSCULAR; INTRAVENOUS; SUBCUTANEOUS at 03:11

## 2018-11-29 RX ADMIN — SODIUM CHLORIDE, SODIUM LACTATE, POTASSIUM CHLORIDE, AND CALCIUM CHLORIDE: 600; 310; 30; 20 INJECTION, SOLUTION INTRAVENOUS at 09:11

## 2018-11-29 RX ADMIN — SODIUM CHLORIDE, SODIUM LACTATE, POTASSIUM CHLORIDE, AND CALCIUM CHLORIDE: 600; 310; 30; 20 INJECTION, SOLUTION INTRAVENOUS at 01:11

## 2018-11-29 RX ADMIN — LIDOCAINE HYDROCHLORIDE 50 MG: 20 INJECTION, SOLUTION INTRAVENOUS at 11:11

## 2018-11-29 RX ADMIN — DEXAMETHASONE SODIUM PHOSPHATE 4 MG: 4 INJECTION, SOLUTION INTRAMUSCULAR; INTRAVENOUS at 11:11

## 2018-11-29 RX ADMIN — CIPROFLOXACIN 400 MG: 2 INJECTION, SOLUTION INTRAVENOUS at 11:11

## 2018-11-29 NOTE — PLAN OF CARE
Problem: Patient Care Overview  Goal: Plan of Care Review  Outcome: Ongoing (interventions implemented as appropriate)  Plan of care reviewed with patient and all questions answer. Pt remains free from fall, injury, and skin breakdown. Pt neurovascular checks are intact. Positions self independently. Patient pain controlled by PRN IV pain medication. No complaints of N/V this shift. Patient maintained NPO as order. Purposeful rounding done. Patient has call light within reach, bed brakes lock, side rails up x2, and bed in low position.

## 2018-11-29 NOTE — [employer] PATIENT INSTRUCTIONS
Discharge Summary/Instructions after an Endoscopic Procedure  Patient Name: Nicolasa Peter  Patient MRN: [id number]  Patient YOB: 1995 Thursday, November 29, 2018  Gabriel Agudelo MD  RESTRICTIONS:  During your procedure today, you received medications for sedation.  These   medications may affect your judgment, balance and coordination.  Therefore,   for 24 hours, you have the following restrictions:   - DO NOT drive a car, operate machinery, make legal/financial decisions,   sign important papers or drink alcohol.    ACTIVITY:  Today: no heavy lifting, straining or running due to procedural   sedation/anesthesia.  The following day: return to full activity including work.  DIET:  Eat and drink normally unless instructed otherwise.     TREATMENT FOR COMMON SIDE EFFECTS:  - Mild abdominal pain, nausea, belching, bloating or excessive gas:  rest,   eat lightly and use a heating pad.  - Sore Throat: treat with throat lozenges and/or gargle with warm salt   water.  - Because air was used during the procedure, expelling large amounts of air   from your rectum or belching is normal.  - If a bowel prep was taken, you may not have a bowel movement for 1-3 days.    This is normal.  SYMPTOMS TO WATCH FOR AND REPORT TO YOUR PHYSICIAN:  1. Abdominal pain or bloating, other than gas cramps.  2. Chest pain.  3. Back pain.  4. Signs of infection such as: chills or fever occurring within 24 hours   after the procedure.  5. Rectal bleeding, which would show as bright red, maroon, or black stools.   (A tablespoon of blood from the rectum is not serious, especially if   hemorrhoids are present.)  6. Vomiting.  7. Weakness or dizziness.  GO DIRECTLY TO THE NEAREST EMERGENCY ROOM IF YOU HAVE ANY OF THE FOLLOWING:      Difficulty breathing              Chills and/or fever over 101 F   Persistent vomiting and/or vomiting blood   Severe abdominal pain   Severe chest pain   Black, tarry stools   Bleeding- more than one  tablespoon   Any other symptom or condition that you feel may need urgent attention  Your doctor recommends these additional instructions:  If any biopsies were taken, your doctors clinic will contact you in 1 to 2   weeks with any results.  - Avoid aspirin and nonsteroidal anti-inflammatory medicines for 2 weeks.   - Watch for pancreatitis, bleeding, perforation, and cholangitis.   - Observe patient's clinical course.   -Check h Pylori serology  - protonix QD  - Return patient to hospital strange for ongoing care.  For questions, problems or results please call your physician - Gabriel Agudelo MD at Work:  [phone].  [facility], EMERGENCY ROOM PHONE NUMBER: [phone], [employer]   [phone].  IF A COMPLICATION OR EMERGENCY SITUATION ARISES AND YOU ARE UNABLE TO REACH   YOUR PHYSICIAN - GO DIRECTLY TO THE EMERGENCY ROOM.  Gabriel Agudelo MD  11/29/2018 12:16:47 PM  This report has been verified and signed electronically.  [employer]

## 2018-11-29 NOTE — PROGRESS NOTES
"[facility]  Hospital Medicine  Progress Note    Patient Name: Nicolasa Peter  MRN: [id number]  Patient Class: IP- Inpatient   Admission Date: 11/24/2018  Length of Stay: 1 days  Attending Physician: Marcio Patterson MD  Primary Care Provider: Edgar Vega MD        Subjective:     Principal Problem:Calculus of gallbladder with acute cholecystitis without obstruction    HPI:  Ms. Nicolasa Peter is a 23 year old female with no significant past medical history who presented to the [facility] Department with complaint of worsening epigastric and right upper quadrant abdominal pain.  She reports the pain as "sharp aching" that radiates to right flank is accompanied by abdominal distention.  She reports intermittent nausea without vomiting or diarrhea.  She states as a result that she has decreased appetite and that pain worsens after eating.  She states she has tried Tums and naprosyn without relief.  She states that her symptoms began on Wednesday. She denies fever/ chills, vomiting or diarrhea. Initial work up in the ED with abdominal ultrasound positive for sonographic Rios's sign and evidence of cholelithiasis with concern for cholecystitis.  She will be admitted under Observation for further evaluation and management  of cholelithiasis.          Hospital Course:  22 y/o female admitted with RUQ pain. Ultrasound of the abdomen completed in the ED showed evidence of cholelithiasis with reported positive sonographic Rios's test concerning for acute cholecystitis.  General Surgery consulted; s/p  cholecystectomy on Monday, November 26, 2018.  Hospital course noteable for elevated Liver function tests post operatively associated with N/V, abdominal pain. US showed new intra and extrahepatic biliary dilation without convincing calculus seen within the prominent common duct although the medial-most aspect of the common duct is unable to be visualized secondary to adjacent " structures.  MRCP revealed Mild-moderate intra and extrahepatic biliary ductal dilatation with a questionable 3 mm filling defect within the distal common duct potentially indicating a retained calculus.    Interval History: pain better controlled  Awaiting ERCP    Review of Systems   Constitutional: Negative for activity change, appetite change, chills, fatigue and fever.   HENT: Negative for congestion and trouble swallowing.    Eyes: Negative for visual disturbance.   Respiratory: Negative for shortness of breath.    Cardiovascular: Negative for chest pain and palpitations.   Gastrointestinal: Positive for abdominal pain, nausea and vomiting. Negative for abdominal distention and diarrhea.   Endocrine: Negative for cold intolerance and heat intolerance.   Genitourinary: Negative for dysuria, flank pain, hematuria and urgency.   Skin: Negative.    Neurological: Negative.    Psychiatric/Behavioral: Negative.      Objective:     Vital Signs (Most Recent):  Temp: 98.8 °F (37.1 °C) (11/29/18 0715)  Pulse: 66 (11/29/18 0715)  Resp: 16 (11/29/18 0715)  BP: 118/63 (11/29/18 0715)  SpO2: 99 % (11/29/18 0715) Vital Signs (24h Range):  Temp:  [98.1 °F (36.7 °C)-99.5 °F (37.5 °C)] 98.8 °F (37.1 °C)  Pulse:  [66-83] 66  Resp:  [16-18] 16  SpO2:  [98 %-100 %] 99 %  BP: (105-123)/(54-70) 118/63     Weight: 89.3 kg (196 lb 13.9 oz)  Body mass index is 33.79 kg/m².    Intake/Output Summary (Last 24 hours) at 11/29/2018 1136  Last data filed at 11/29/2018 0628  Gross per 24 hour   Intake 1433.33 ml   Output --   Net 1433.33 ml      Physical Exam   Constitutional: She is oriented to person, place, and time. She appears well-developed and well-nourished. No distress.   HENT:   Head: Normocephalic and atraumatic.   Eyes: Conjunctivae are normal. Right eye exhibits no discharge. Left eye exhibits no discharge. No scleral icterus.   Neck: Normal range of motion. Neck supple.   Cardiovascular: Normal rate, regular rhythm, normal heart  sounds and intact distal pulses.   Pulmonary/Chest: Effort normal and breath sounds normal. No stridor. No respiratory distress.   Abdominal: Soft. Bowel sounds are normal. She exhibits no distension. There is tenderness (diffuse TTP). There is no rebound and no guarding.   Musculoskeletal: Normal range of motion. She exhibits no edema, tenderness or deformity.   Neurological: She is alert and oriented to person, place, and time. No cranial nerve deficit.   Skin: Skin is warm and dry. Capillary refill takes less than 2 seconds. She is not diaphoretic.   Psychiatric: She has a normal mood and affect.   Nursing note and vitals reviewed.      Significant Labs:   CBC:   Recent Labs   Lab 11/28/18 0529 11/29/18  0534   WBC 8.01 6.79   HGB 13.2 12.7   HCT 39.8 37.7    259     CMP:   Recent Labs   Lab 11/28/18 0529 11/29/18  0534    139   K 3.5 3.6    106   CO2 23 20*   GLU 90 73   BUN 5* 6   CREATININE 0.8 0.7   CALCIUM 8.8 9.0   PROT 7.2 7.0   ALBUMIN 3.8 3.3*   BILITOT 3.8* 1.8*   ALKPHOS 165* 180*   * 87*   * 348*   ANIONGAP 8 13   EGFRNONAA >60 >60     All pertinent labs within the past 24 hours have been reviewed.    Significant Imaging: I have reviewed all pertinent imaging results/findings within the past 24 hours.   Imaging Results          US Abdomen Limited (Final result)  Result time 11/24/18 23:37:55    Final result by Josie Paredes MD (11/24/18 23:37:55)                 Impression:      1. Cholelithiasis.  Sonographic Rios sign is reportedly positive per ultrasonographer which would suggest acute cholecystitis, however there are no secondary signs of gallbladder wall thickening, hyperemia, or pericholecystic fluid seen.  2. Mild hepatomegaly.      Electronically signed by: Josie Paredes MD  Date:    11/24/2018  Time:    23:37             Narrative:    EXAMINATION:  US ABDOMEN LIMITED    CLINICAL HISTORY:  RUQ/epigastric abdominal pain;    TECHNIQUE:  Limited  ultrasound of the right upper quadrant of the abdomen (including pancreas, liver, gallbladder, common bile duct, and right kidney) was performed.    COMPARISON:  None.    FINDINGS:  The liver is mildly enlarged measuring 18.4cm.  Hepatic parenchyma is homogeneous without evidence for masses.  No intra- or extrahepatic biliary ductal dilatation. The common bile duct measures 0.5 cm.  The gallbladder demonstrates multiple mobile stones.  No evidence of gallbladder wall thickening, hyperemia, or pericholecystic fluid.  Sonographic Rios's sign is reportedly positive.  The visualized portions of the pancreas, IVC, and aorta appear normal. The right kidney measures 11.2 cm. No ascites.                                  Assessment/Plan:      * Calculus of gallbladder with acute cholecystitis without obstruction    - US abdomen 11/24/2018:  Cholelithiasis; sonographic Rios sign suggesting acute cholecystitis; no secondary signs of gallbladder wall thickening, hyperemia, or pericholecystic fluid seen.  - s/p lap dalton 11/26/2018 now with elevated liver function tests; bili rising; patient with continued pain, N/V unable to tolerate po  - MRCP 11/28/2018 Mild-moderate intra and extrahepatic biliary ductal dilatation with a questionable 3 mm filling defect within the distal common duct potentially indicating a retained calculus.  - ERCP consulted        Elevated LFTs    - improving, see above       Hyperbilirubinemia    - improving  - see above       Intractable right upper quadrant abdominal pain    -US abdomen 11/24/2018:  Cholelithiasis; sonographic Rios sign suggesting acute cholecystitis; no secondary signs of gallbladder wall thickening, hyperemia, or pericholecystic fluid seen.            VTE Risk Mitigation (From admission, onward)        Ordered     Reason for no Mechanical VTE Prophylaxis  Once      11/25/18 0143     IP VTE LOW RISK PATIENT  Once      11/25/18 0143              Niecy Gregory PA-C  Department  of [place] [facility] Medicine   [facility]

## 2018-11-29 NOTE — ASSESSMENT & PLAN NOTE
- US abdomen 11/24/2018:  Cholelithiasis; sonographic Rios sign suggesting acute cholecystitis; no secondary signs of gallbladder wall thickening, hyperemia, or pericholecystic fluid seen.  - s/p lap dalton 11/26/2018 now with elevated liver function tests; bili rising; patient with continued pain, N/V unable to tolerate po  - MRCP 11/28/2018 Mild-moderate intra and extrahepatic biliary ductal dilatation with a questionable 3 mm filling defect within the distal common duct potentially indicating a retained calculus.  - ERCP consulted

## 2018-11-29 NOTE — ANESTHESIA POSTPROCEDURE EVALUATION
"Anesthesia Post Evaluation    Patient: Nicolasa Peter    Procedure(s) Performed: Procedure(s) (LRB):  ERCP (ENDOSCOPIC RETROGRADE CHOLANGIOPANCREATOGRAPHY) (N/A)    Final Anesthesia Type: general  Patient location during evaluation: PACU  Patient participation: Yes- Able to Participate  Level of consciousness: awake and alert  Post-procedure vital signs: reviewed and stable  Pain management: adequate  Airway patency: patent  PONV status at discharge: No PONV  Anesthetic complications: no      Cardiovascular status: blood pressure returned to baseline  Respiratory status: unassisted, spontaneous ventilation and room air  Hydration status: euvolemic  Follow-up not needed.        Visit Vitals  /61   Pulse 65   Temp 36.7 °C (98 °F) (Oral)   Resp 16   Ht 5' 4" (1.626 m)   Wt 89.3 kg (196 lb 13.9 oz)   LMP 11/25/2018 (Exact Date)   SpO2 100%   Breastfeeding? No   BMI 33.79 kg/m²       Pain/Yaron Score: Pain Assessment Performed: Yes (11/29/2018 12:22 PM)  Presence of Pain: denies (11/29/2018 12:22 PM)  Pain Rating Prior to Med Admin: 5 (11/29/2018  8:47 AM)  Pain Rating Post Med Admin: 0 (11/29/2018  9:35 AM)  Yaron Score: 8 (11/29/2018 12:22 PM)        "

## 2018-11-29 NOTE — PLAN OF CARE
Problem: Patient Care Overview  Goal: Plan of Care Review  Outcome: Ongoing (interventions implemented as appropriate)  No O2 in use, SpO2 99% on room air.

## 2018-11-29 NOTE — PLAN OF CARE
Problem: Patient Care Overview  Goal: Plan of Care Review  Outcome: Ongoing (interventions implemented as appropriate)  Pt remains on RA. Pt is still sleeping from procedure. IS placed on side table.

## 2018-11-29 NOTE — TRANSFER OF CARE
"Anesthesia Transfer of Care Note    Patient: Nicolasa Peter    Procedure(s) Performed: Procedure(s) (LRB):  ERCP (ENDOSCOPIC RETROGRADE CHOLANGIOPANCREATOGRAPHY) (N/A)    Patient location: PACU    Anesthesia Type: general    Transport from OR: Transported from OR on room air with adequate spontaneous ventilation    Post pain: adequate analgesia    Post assessment: no apparent anesthetic complications    Post vital signs: stable    Level of consciousness: awake    Nausea/Vomiting: no nausea/vomiting    Complications: none    Transfer of care protocol was followed      Last vitals:   Visit Vitals  /63 (BP Location: Left arm, Patient Position: Lying)   Pulse 66   Temp 37.1 °C (98.8 °F) (Oral)   Resp 16   Ht 5' 4" (1.626 m)   Wt 89.3 kg (196 lb 13.9 oz)   LMP 11/25/2018 (Exact Date)   SpO2 99%   Breastfeeding? No   BMI 33.79 kg/m²     "

## 2018-11-29 NOTE — ANESTHESIA PREPROCEDURE EVALUATION
11/29/2018  Nicolasa Peter is a 23 y.o., female.    Pre-op Assessment    I have reviewed the Patient Summary Reports.     I have reviewed the Nursing Notes.   I have reviewed the Medications.     Review of Systems  Anesthesia Hx:  No previous Anesthesia    Social:  Non-Smoker, No Alcohol Use    Hematology/Oncology:  Hematology Normal   Oncology Normal     EENT/Dental:EENT/Dental Normal   Cardiovascular:  Cardiovascular Normal Exercise tolerance: good     Pulmonary:  Pulmonary Normal    Renal/:  Renal/ Normal     Hepatic/GI:  Hepatic/GI Normal    Musculoskeletal:  Musculoskeletal Normal    Neurological:  Neurology Normal    Endocrine:  Endocrine Normal    Dermatological:  Skin Normal    Psych:  Psychiatric Normal           Physical Exam  General:  Obesity    Airway/Jaw/Neck:  Airway Findings: Mouth Opening: Normal Tongue: Normal  General Airway Assessment: Adult  Mallampati: I  TM Distance: Normal, at least 6 cm  Jaw/Neck Findings:  Neck ROM: Normal ROM      Dental:  Dental Findings: In tact        Mental Status:  Mental Status Findings:  Cooperative, Alert and Oriented         Anesthesia Plan  Type of Anesthesia, risks & benefits discussed:  Anesthesia Type:  general  Patient's Preference:   Intra-op Monitoring Plan: standard ASA monitors  Intra-op Monitoring Plan Comments:   Post Op Pain Control Plan: per primary service following discharge from PACU  Post Op Pain Control Plan Comments:   Induction:    Beta Blocker:         Informed Consent: Patient understands risks and agrees with Anesthesia plan.  Questions answered. Anesthesia consent signed with patient.  ASA Score: 2  emergent   Day of Surgery Review of History & Physical:    H&P update referred to the surgeon.         Ready For Surgery From Anesthesia Perspective.

## 2018-11-29 NOTE — PLAN OF CARE
Problem: Patient Care Overview  Goal: Plan of Care Review  Outcome: Ongoing (interventions implemented as appropriate)  VSS on RA and afebrile. Ambulated room independently. Medicated for nausea and mild relief obtained. Pain mildly controlled with PRN PO/IV meds. NPO status maintained. Lap sites x4, WDL. Hourly rounding performed. Will continue to monitor.

## 2018-11-29 NOTE — SUBJECTIVE & OBJECTIVE
Interval History: pain better controlled  Awaiting ERCP    Review of Systems   Constitutional: Negative for activity change, appetite change, chills, fatigue and fever.   HENT: Negative for congestion and trouble swallowing.    Eyes: Negative for visual disturbance.   Respiratory: Negative for shortness of breath.    Cardiovascular: Negative for chest pain and palpitations.   Gastrointestinal: Positive for abdominal pain, nausea and vomiting. Negative for abdominal distention and diarrhea.   Endocrine: Negative for cold intolerance and heat intolerance.   Genitourinary: Negative for dysuria, flank pain, hematuria and urgency.   Skin: Negative.    Neurological: Negative.    Psychiatric/Behavioral: Negative.      Objective:     Vital Signs (Most Recent):  Temp: 98.8 °F (37.1 °C) (11/29/18 0715)  Pulse: 66 (11/29/18 0715)  Resp: 16 (11/29/18 0715)  BP: 118/63 (11/29/18 0715)  SpO2: 99 % (11/29/18 0715) Vital Signs (24h Range):  Temp:  [98.1 °F (36.7 °C)-99.5 °F (37.5 °C)] 98.8 °F (37.1 °C)  Pulse:  [66-83] 66  Resp:  [16-18] 16  SpO2:  [98 %-100 %] 99 %  BP: (105-123)/(54-70) 118/63     Weight: 89.3 kg (196 lb 13.9 oz)  Body mass index is 33.79 kg/m².    Intake/Output Summary (Last 24 hours) at 11/29/2018 1136  Last data filed at 11/29/2018 0628  Gross per 24 hour   Intake 1433.33 ml   Output --   Net 1433.33 ml      Physical Exam   Constitutional: She is oriented to person, place, and time. She appears well-developed and well-nourished. No distress.   HENT:   Head: Normocephalic and atraumatic.   Eyes: Conjunctivae are normal. Right eye exhibits no discharge. Left eye exhibits no discharge. No scleral icterus.   Neck: Normal range of motion. Neck supple.   Cardiovascular: Normal rate, regular rhythm, normal heart sounds and intact distal pulses.   Pulmonary/Chest: Effort normal and breath sounds normal. No stridor. No respiratory distress.   Abdominal: Soft. Bowel sounds are normal. She exhibits no distension. There  is tenderness (diffuse TTP). There is no rebound and no guarding.   Musculoskeletal: Normal range of motion. She exhibits no edema, tenderness or deformity.   Neurological: She is alert and oriented to person, place, and time. No cranial nerve deficit.   Skin: Skin is warm and dry. Capillary refill takes less than 2 seconds. She is not diaphoretic.   Psychiatric: She has a normal mood and affect.   Nursing note and vitals reviewed.      Significant Labs:   CBC:   Recent Labs   Lab 11/28/18 0529 11/29/18  0534   WBC 8.01 6.79   HGB 13.2 12.7   HCT 39.8 37.7    259     CMP:   Recent Labs   Lab 11/28/18 0529 11/29/18 0534    139   K 3.5 3.6    106   CO2 23 20*   GLU 90 73   BUN 5* 6   CREATININE 0.8 0.7   CALCIUM 8.8 9.0   PROT 7.2 7.0   ALBUMIN 3.8 3.3*   BILITOT 3.8* 1.8*   ALKPHOS 165* 180*   * 87*   * 348*   ANIONGAP 8 13   EGFRNONAA >60 >60     All pertinent labs within the past 24 hours have been reviewed.    Significant Imaging: I have reviewed all pertinent imaging results/findings within the past 24 hours.   Imaging Results          US Abdomen Limited (Final result)  Result time 11/24/18 23:37:55    Final result by Josie Paredes MD (11/24/18 23:37:55)                 Impression:      1. Cholelithiasis.  Sonographic Rios sign is reportedly positive per ultrasonographer which would suggest acute cholecystitis, however there are no secondary signs of gallbladder wall thickening, hyperemia, or pericholecystic fluid seen.  2. Mild hepatomegaly.      Electronically signed by: Josie Paredes MD  Date:    11/24/2018  Time:    23:37             Narrative:    EXAMINATION:  US ABDOMEN LIMITED    CLINICAL HISTORY:  RUQ/epigastric abdominal pain;    TECHNIQUE:  Limited ultrasound of the right upper quadrant of the abdomen (including pancreas, liver, gallbladder, common bile duct, and right kidney) was performed.    COMPARISON:  None.    FINDINGS:  The liver is mildly enlarged  measuring 18.4cm.  Hepatic parenchyma is homogeneous without evidence for masses.  No intra- or extrahepatic biliary ductal dilatation. The common bile duct measures 0.5 cm.  The gallbladder demonstrates multiple mobile stones.  No evidence of gallbladder wall thickening, hyperemia, or pericholecystic fluid.  Sonographic Rios's sign is reportedly positive.  The visualized portions of the pancreas, IVC, and aorta appear normal. The right kidney measures 11.2 cm. No ascites.

## 2018-11-29 NOTE — NURSING
Dr Agudelo at nursing station requested LR 125ml. Order will carry out when patient comes back to room after ERCP.

## 2018-11-30 VITALS
TEMPERATURE: 98 F | OXYGEN SATURATION: 99 % | RESPIRATION RATE: 16 BRPM | HEART RATE: 67 BPM | SYSTOLIC BLOOD PRESSURE: 112 MMHG | DIASTOLIC BLOOD PRESSURE: 56 MMHG | BODY MASS INDEX: 33.61 KG/M2 | WEIGHT: 196.88 LBS | HEIGHT: 64 IN

## 2018-11-30 LAB
ALBUMIN SERPL BCP-MCNC: 3 G/DL
ALP SERPL-CCNC: 151 U/L
ALT SERPL W/O P-5'-P-CCNC: 230 U/L
ANION GAP SERPL CALC-SCNC: 9 MMOL/L
AST SERPL-CCNC: 48 U/L
BASOPHILS # BLD AUTO: 0.02 K/UL
BASOPHILS NFR BLD: 0.2 %
BILIRUB SERPL-MCNC: 0.9 MG/DL
BUN SERPL-MCNC: 6 MG/DL
CALCIUM SERPL-MCNC: 8.7 MG/DL
CHLORIDE SERPL-SCNC: 108 MMOL/L
CO2 SERPL-SCNC: 24 MMOL/L
CREAT SERPL-MCNC: 0.7 MG/DL
DIFFERENTIAL METHOD: ABNORMAL
EOSINOPHIL # BLD AUTO: 0.1 K/UL
EOSINOPHIL NFR BLD: 0.9 %
ERYTHROCYTE [DISTWIDTH] IN BLOOD BY AUTOMATED COUNT: 13.9 %
EST. GFR  (AFRICAN AMERICAN): >60 ML/MIN/1.73 M^2
EST. GFR  (NON AFRICAN AMERICAN): >60 ML/MIN/1.73 M^2
GLUCOSE SERPL-MCNC: 99 MG/DL
HCT VFR BLD AUTO: 34.8 %
HGB BLD-MCNC: 12.1 G/DL
LYMPHOCYTES # BLD AUTO: 2 K/UL
LYMPHOCYTES NFR BLD: 23.5 %
MCH RBC QN AUTO: 27.6 PG
MCHC RBC AUTO-ENTMCNC: 34.8 G/DL
MCV RBC AUTO: 79 FL
MONOCYTES # BLD AUTO: 0.8 K/UL
MONOCYTES NFR BLD: 9.6 %
NEUTROPHILS # BLD AUTO: 5.5 K/UL
NEUTROPHILS NFR BLD: 65.6 %
PLATELET # BLD AUTO: 254 K/UL
PMV BLD AUTO: 11.3 FL
POTASSIUM SERPL-SCNC: 3.5 MMOL/L
PROT SERPL-MCNC: 6.5 G/DL
RBC # BLD AUTO: 4.39 M/UL
SODIUM SERPL-SCNC: 141 MMOL/L
WBC # BLD AUTO: 8.46 K/UL

## 2018-11-30 PROCEDURE — 94761 N-INVAS EAR/PLS OXIMETRY MLT: CPT

## 2018-11-30 PROCEDURE — 25000003 PHARM REV CODE 250: Performed by: SPECIALIST

## 2018-11-30 PROCEDURE — 36415 COLL VENOUS BLD VENIPUNCTURE: CPT

## 2018-11-30 PROCEDURE — 80053 COMPREHEN METABOLIC PANEL: CPT

## 2018-11-30 PROCEDURE — 63600175 PHARM REV CODE 636 W HCPCS: Performed by: PHYSICIAN ASSISTANT

## 2018-11-30 PROCEDURE — 85025 COMPLETE CBC W/AUTO DIFF WBC: CPT

## 2018-11-30 PROCEDURE — C9113 INJ PANTOPRAZOLE SODIUM, VIA: HCPCS | Performed by: PHYSICIAN ASSISTANT

## 2018-11-30 PROCEDURE — 25000003 PHARM REV CODE 250: Performed by: INTERNAL MEDICINE

## 2018-11-30 PROCEDURE — 94799 UNLISTED PULMONARY SVC/PX: CPT

## 2018-11-30 PROCEDURE — 99239 HOSP IP/OBS DSCHRG MGMT >30: CPT | Mod: ,,, | Performed by: HOSPITALIST

## 2018-11-30 RX ORDER — HYDROCODONE BITARTRATE AND ACETAMINOPHEN 5; 325 MG/1; MG/1
1 TABLET ORAL EVERY 6 HOURS PRN
Status: DISCONTINUED | OUTPATIENT
Start: 2018-11-30 | End: 2018-11-30 | Stop reason: HOSPADM

## 2018-11-30 RX ORDER — HYDROCODONE BITARTRATE AND ACETAMINOPHEN 5; 325 MG/1; MG/1
1 TABLET ORAL EVERY 6 HOURS PRN
Qty: 20 TABLET | Refills: 0 | Status: SHIPPED | OUTPATIENT
Start: 2018-11-30 | End: 2023-10-02 | Stop reason: ALTCHOICE

## 2018-11-30 RX ADMIN — PANTOPRAZOLE SODIUM 40 MG: 40 INJECTION, POWDER, FOR SOLUTION INTRAVENOUS at 10:11

## 2018-11-30 RX ADMIN — OXYCODONE HYDROCHLORIDE 10 MG: 5 TABLET ORAL at 04:11

## 2018-11-30 RX ADMIN — SODIUM CHLORIDE, SODIUM LACTATE, POTASSIUM CHLORIDE, AND CALCIUM CHLORIDE: 600; 310; 30; 20 INJECTION, SOLUTION INTRAVENOUS at 05:11

## 2018-11-30 NOTE — PLAN OF CARE
Problem: Patient Care Overview  Goal: Plan of Care Review  Outcome: Ongoing (interventions implemented as appropriate)  Patient in no apparent distress. Sat's 100  % on room air. IS done . Will continue to monitor.

## 2018-11-30 NOTE — PLAN OF CARE
Problem: Patient Care Overview  Goal: Plan of Care Review  Outcome: Ongoing (interventions implemented as appropriate)  Pt remains on RA. IS done

## 2018-11-30 NOTE — PLAN OF CARE
11:33 AM  In agreement and eager for DC.  VU of DC instructions, paperwork passed. IV removed with cath tip intact, WNL.  To be DC home with family.  Will be escorted downstairs via  transport team  Medications delivered to bedside.   Free from falls or skin breakdown this hospital admission.

## 2018-11-30 NOTE — DISCHARGE SUMMARY
"[facility]  Hospital Medicine  Discharge Summary      Patient Name: Nicolasa Peter  MRN: [id number]  Admission Date: 11/24/2018  Hospital Length of Stay: 2 days  Discharge Date and Time:  11/30/2018 10:45 AM  Attending Physician: Marcio Patterson MD   Discharging Provider: Marcio Patterson MD  Primary Care Provider: Edgar Vega MD      HPI:   Ms. Nicolasa Peter is a 23 year old female with no significant past medical history who presented to the [facility] Department with complaint of worsening epigastric and right upper quadrant abdominal pain.  She reports the pain as "sharp aching" that radiates to right flank is accompanied by abdominal distention.  She reports intermittent nausea without vomiting or diarrhea.  She states as a result that she has decreased appetite and that pain worsens after eating.  She states she has tried Tums and naprosyn without relief.  She states that her symptoms began on Wednesday. She denies fever/ chills, vomiting or diarrhea. Initial work up in the ED with abdominal ultrasound positive for sonographic Rios's sign and evidence of cholelithiasis with concern for cholecystitis.  She will be admitted under Observation for further evaluation and management  of cholelithiasis.          Procedure(s) (LRB):  ERCP (ENDOSCOPIC RETROGRADE CHOLANGIOPANCREATOGRAPHY) (N/A)      Hospital Course:   22 y/o female admitted with RUQ pain. Ultrasound of the abdomen completed in the ED showed evidence of cholelithiasis with reported positive sonographic Rios's test concerning for acute cholecystitis.  General Surgery consulted; s/p  cholecystectomy on Monday, November 26, 2018.  Hospital course noteable for elevated Liver function tests post operatively associated with N/V, abdominal pain. US showed new intra and extrahepatic biliary dilation without convincing calculus seen within the prominent common duct although the medial-most aspect of the common duct is " unable to be visualized secondary to adjacent structures.  MRCP revealed Mild-moderate intra and extrahepatic biliary ductal dilatation with a questionable 3 mm filling defect within the distal common duct potentially indicating a retained calculus.  She underwent ERCP by Dr. Tuttle on 11/29 with excellent results.  Her pain has nearly resolved, she is eating and stooling without difficulty and is eager to go home.  She has been cleared for discharge by GI and surgery and will be discharged home today.     Consults:   Consults (From admission, onward)        Status Ordering Provider     Inpatient consult to Gastroenterology  Once     Provider:  Tony Tuttle MD    Completed JESUS MADISON     Inpatient consult to General surgery  Once     Provider:  (Not yet assigned)    ISAURO Parikh          No new Assessment & Plan notes have been filed under this hospital service since the last note was generated.  Service: Hospital Medicine    Final Active Diagnoses:    Diagnosis Date Noted POA    PRINCIPAL PROBLEM:  Calculus of gallbladder with acute cholecystitis without obstruction [K80.00] 11/24/2018 Yes    Hyperbilirubinemia [E80.6] 11/28/2018 No    Elevated LFTs [R94.5] 11/28/2018 No    Calculus of gallbladder without cholecystitis without obstruction [K80.20]  Yes    Intractable right upper quadrant abdominal pain [R10.11] 11/25/2018 Yes      Problems Resolved During this Admission:       Discharged Condition: good    Disposition: Home or Self Care    Follow Up:  Follow-up Information     Edgar Vega MD In 1 week.    Specialty:  Obstetrics  Contact information:  [address] [facility] [address]  [phone]             Tony Tuttle MD In 2 weeks.    Specialty:  Gastroenterology  Contact information:  [address] [address]  [phone]             Marcio Gonzales Jr, MD In 1 week.    Specialties:  Vascular Surgery, General Surgery  Contact  information:  [address]  [address]  [facility] [address]  [phone]                 Patient Instructions:      Diet Adult Regular     Notify your health care provider if you experience any of the following:  increased confusion or weakness     Notify your health care provider if you experience any of the following:  persistent dizziness, light-headedness, or visual disturbances     Notify your health care provider if you experience any of the following:  worsening rash     Notify your health care provider if you experience any of the following:  severe persistent headache     Notify your health care provider if you experience any of the following:  difficulty breathing or increased cough     Notify your health care provider if you experience any of the following:  redness, tenderness, or signs of infection (pain, swelling, redness, odor or green/yellow discharge around incision site)     Notify your health care provider if you experience any of the following:  severe uncontrolled pain     Notify your health care provider if you experience any of the following:  persistent nausea and vomiting or diarrhea     Notify your health care provider if you experience any of the following:  temperature >100.4     Activity as tolerated       Significant Diagnostic Studies: Labs:   BMP:   Recent Labs   Lab 11/29/18 0534 11/30/18  0533   GLU 73 99    141   K 3.6 3.5    108   CO2 20* 24   BUN 6 6   CREATININE 0.7 0.7   CALCIUM 9.0 8.7   , CMP   Recent Labs   Lab 11/29/18 0534 11/30/18  0533    141   K 3.6 3.5    108   CO2 20* 24   GLU 73 99   BUN 6 6   CREATININE 0.7 0.7   CALCIUM 9.0 8.7   PROT 7.0 6.5   ALBUMIN 3.3* 3.0*   BILITOT 1.8* 0.9   ALKPHOS 180* 151*   AST 87* 48*   * 230*   ANIONGAP 13 9   ESTGFRAFRICA >60 >60   EGFRNONAA >60 >60    and CBC   Recent Labs   Lab 11/29/18 0534 11/30/18  0533   WBC 6.79 8.46   HGB 12.7 12.1   HCT 37.7 34.8*    254       Pending Diagnostic  Studies:     None         Medications:  Reconciled Home Medications:      Medication List      START taking these medications    HYDROcodone-acetaminophen 5-325 mg per tablet  Commonly known as:  NORCO  Take 1 tablet by mouth every 6 (six) hours as needed.        CONTINUE taking these medications    PRENATAL 1+1 ORAL  Take by mouth.            Indwelling Lines/Drains at time of discharge:   Lines/Drains/Airways          None          Time spent on the discharge of patient: 35 minutes  Patient was seen and examined on the date of discharge and determined to be suitable for discharge.         Marcio Patterson MD  Department of Hospital Medicine  [facility]

## 2018-11-30 NOTE — PLAN OF CARE
Problem: Patient Care Overview  Goal: Plan of Care Review  Outcome: Ongoing (interventions implemented as appropriate)  Vs stable. Tolerating food/fluids..Slept well.Medicated for c/o to abdomen x 1

## 2018-11-30 NOTE — PROGRESS NOTES
This lady is feeling better and she denies abdominal pain. No acute visceral signs and no signs of cholangitis or visceral ischemia. She is tolerating a diet well. Discussed diet and post-cholecystectomy related issues.    T<100 HR 70  Anicteric no temporal wasting  Neck supple no mass  Heart RR no gallop  Chest clear no wheeze  Abdomen soft no tenderness no masses or tympany or ascites  Extremities no cyanosis or edema  Neuro Alert oriented    Labs reviewed with improving liver function studies.    Impression: stable post ERCP with CBD sweep and extraction of sludge  Can discharge from GI perspective. Please re-consult as needed. Thanks.

## 2018-12-06 NOTE — PHYSICIAN QUERY
PT Name: Nicolasa Peter  MR #: [id number]    Physician Query Form - Pathology Findings Clarification     CDS: Elsie Hazel RN     Contact information:  [email or web address]    Phone: [phone]  This form is a permanent document in the medical record.     Query Date: December 6, 2018      By submitting this query, we are merely seeking further clarification of documentation.  Please utilize your independent clinical judgment when addressing the question(s) below.      The medical record contains the following:     Findings Supporting Clinical Information Location in Medical Record   CHRONIC CHOLECYSTITIS AND CHOLELITHIASIS; CHOLESTEROLOSIS.         FINAL PATHOLOGIC DIAGNOSIS  GALLBLADDER: CHRONIC CHOLECYSTITIS AND CHOLELITHIASIS; CHOLESTEROLOSIS         Path Report     Please document the clinical significance of the Pathologists findings of _Chronic Cholecystitis and Cholelithiasis; Cholesterolosis_.    [ x  ] I agree with the Pathology Findings   [   ] I do not agree with the Pathology Findings   [   ] Other/Clarification of Findings:   [   ] Clinically Insignificant   [  ] Clinically Undetermined       Please document in your progress notes daily for the duration of treatment until resolved and include in your discharge summary.

## 2020-02-05 ENCOUNTER — HOSPITAL ENCOUNTER (EMERGENCY)
Facility: OTHER | Age: 25
Discharge: HOME | End: 2020-02-05
Attending: EMERGENCY MEDICINE

## 2020-02-05 VITALS
WEIGHT: 180 LBS | HEART RATE: 89 BPM | RESPIRATION RATE: 18 BRPM | BODY MASS INDEX: 30.73 KG/M2 | SYSTOLIC BLOOD PRESSURE: 107 MMHG | DIASTOLIC BLOOD PRESSURE: 57 MMHG | HEIGHT: 64 IN | TEMPERATURE: 99 F | OXYGEN SATURATION: 100 %

## 2020-02-05 DIAGNOSIS — B34.9 VIRAL SYNDROME: Primary | ICD-10-CM

## 2020-02-05 LAB
B-HCG UR QL: NEGATIVE
CTP QC/QA: YES
CTP QC/QA: YES
POC MOLECULAR INFLUENZA A AGN: NEGATIVE
POC MOLECULAR INFLUENZA B AGN: NEGATIVE

## 2020-02-05 PROCEDURE — 25000003 PHARM REV CODE 250: Performed by: PHYSICIAN ASSISTANT

## 2020-02-05 PROCEDURE — 99284 EMERGENCY DEPT VISIT MOD MDM: CPT | Mod: 25

## 2020-02-05 PROCEDURE — 63600175 PHARM REV CODE 636 W HCPCS: Performed by: PHYSICIAN ASSISTANT

## 2020-02-05 PROCEDURE — 96372 THER/PROPH/DIAG INJ SC/IM: CPT | Mod: 59

## 2020-02-05 PROCEDURE — 81025 URINE PREGNANCY TEST: CPT | Performed by: EMERGENCY MEDICINE

## 2020-02-05 RX ORDER — NAPROXEN 500 MG/1
500 TABLET ORAL 2 TIMES DAILY WITH MEALS
Qty: 20 TABLET | Refills: 0 | OUTPATIENT
Start: 2020-02-05 | End: 2022-11-21

## 2020-02-05 RX ORDER — KETOROLAC TROMETHAMINE 30 MG/ML
30 INJECTION, SOLUTION INTRAMUSCULAR; INTRAVENOUS
Status: COMPLETED | OUTPATIENT
Start: 2020-02-05 | End: 2020-02-05

## 2020-02-05 RX ORDER — ONDANSETRON 4 MG/1
4 TABLET, ORALLY DISINTEGRATING ORAL
Status: COMPLETED | OUTPATIENT
Start: 2020-02-05 | End: 2020-02-05

## 2020-02-05 RX ORDER — ONDANSETRON 4 MG/1
4 TABLET, ORALLY DISINTEGRATING ORAL EVERY 6 HOURS PRN
Qty: 20 TABLET | Refills: 0 | Status: SHIPPED | OUTPATIENT
Start: 2020-02-05 | End: 2022-10-31 | Stop reason: SDUPTHER

## 2020-02-05 RX ADMIN — KETOROLAC TROMETHAMINE 30 MG: 30 INJECTION, SOLUTION INTRAMUSCULAR; INTRAVENOUS at 10:02

## 2020-02-05 RX ADMIN — ONDANSETRON 4 MG: 4 TABLET, ORALLY DISINTEGRATING ORAL at 10:02

## 2020-02-06 NOTE — ED PROVIDER NOTES
"Encounter Date: 2/5/2020       History     Chief Complaint   Patient presents with    Headache     pt reports headache, nausea, vomitting, diarrhea with onset yesterday, "bloated stomach", denies use of medicaitons for relief of pain     Patient is a 24-year-old female presenting to the emergency department with multiple complaints.  The patient states that yesterday she started developing headache, abdominal pain, body aches, nausea, vomiting, diarrhea.  She states that she last had an episode of vomiting and diarrhea prior to arrival.  She reports generalized discomfort to her abdomen.  She states her head hurts.  She denies any fever chills.  She denies taking any medication for symptoms thus far.  She denies any known sick contacts.This is the extent of the patient's complaints at this time.       The history is provided by the patient.     Review of patient's allergies indicates:  No Known Allergies  No past medical history on file.  Past Surgical History:   Procedure Laterality Date    ERCP N/A 11/29/2018    Procedure: ERCP (ENDOSCOPIC RETROGRADE CHOLANGIOPANCREATOGRAPHY);  Surgeon: Gabriel Agudelo MD;  [facility];  Service: Endoscopy;  Laterality: N/A;    LAPAROSCOPIC CHOLECYSTECTOMY N/A 11/26/2018    Procedure: CHOLECYSTECTOMY, LAPAROSCOPIC (ADD ON);  Surgeon: Marcio Gonzales Jr., MD;  Location: [facility];  Service: General;  Laterality: N/A;  (ADD ON)     No family history on file.  Social History     Tobacco Use    Smoking status: Never Smoker   Substance Use Topics    Alcohol use: No    Drug use: No     Review of Systems   Constitutional: Positive for fatigue. Negative for activity change, appetite change, chills and fever.   HENT: Positive for congestion. Negative for rhinorrhea and sore throat.    Eyes: Negative for photophobia and visual disturbance.   Respiratory: Positive for cough. Negative for shortness of breath and wheezing.    Cardiovascular: Negative for chest pain. "   Gastrointestinal: Positive for diarrhea, nausea and vomiting. Negative for abdominal pain.   Genitourinary: Negative for dysuria, hematuria and urgency.   Musculoskeletal: Positive for myalgias. Negative for back pain and neck pain.   Skin: Negative for color change and wound.   Neurological: Positive for headaches. Negative for weakness.   Psychiatric/Behavioral: Negative for agitation and confusion.       Physical Exam     Initial Vitals [02/05/20 2147]   BP Pulse Resp Temp SpO2   123/60 (!) 111 18 99 °F (37.2 °C) 98 %      MAP       --         Physical Exam    Nursing note and vitals reviewed.  Constitutional: She appears well-developed and well-nourished. She is not diaphoretic.  Non-toxic appearance. She does not have a sickly appearance. No distress.   Well-appearing,  female accompanied the emergency room.  Speaking clearly in full sentences.  No acute distress.   HENT:   Head: Normocephalic and atraumatic.   Right Ear: Hearing and external ear normal.   Left Ear: Hearing and external ear normal.   Nose: Nose normal.   Mouth/Throat: Oropharynx is clear and moist.   Eyes: Conjunctivae and EOM are normal.   Neck: Normal range of motion. Neck supple.   Cardiovascular: Normal rate, regular rhythm and normal heart sounds.   Pulmonary/Chest: Breath sounds normal. No respiratory distress. She has no wheezes.   Abdominal: Soft. Bowel sounds are normal. She exhibits no distension. There is no tenderness. There is no rebound and no guarding.   Abdomen is soft and nontender   Musculoskeletal: Normal range of motion.   Neurological: She is alert and oriented to person, place, and time. She has normal strength. No cranial nerve deficit or sensory deficit. GCS eye subscore is 4. GCS verbal subscore is 5. GCS motor subscore is 6.   Skin: Skin is warm.   Psychiatric: She has a normal mood and affect. Her behavior is normal. Judgment and thought content normal.         ED Course   Procedures  Labs Reviewed    POCT URINE PREGNANCY   POCT INFLUENZA A/B MOLECULAR             Medical Decision Making:   Initial Assessment:     Urgent evaluation a 24-year-old female presenting to the emergency department for evaluation of flu-like symptoms.  Patient is afebrile, nontoxic appearing, hemodynamically stable. Physical exam reveals regular rate rhythm, lungs are clear to auscultation bilaterally, abdomen is soft nontender. No focal neurological deficits or weaknesses.  Of note, patient's vital signs at triage showed tachycardia, this had resolved upon my examination. Will plan for Toradol, Zofran, rapid influenza and reassess.    Clinical Tests:   Lab Tests: Ordered and Reviewed  ED Management:    Rapid influenza is negative. Signs symptoms likely secondary to a viral etiology.  No indication for antibiotic treatment this time.  Will plan to treat symptomatically with a prescription for ibuprofen, Zofran.  Counseled home care treatment.  Discharged stable condition. The patient was instructed to follow up with a primary care provider in 2 days or to return to the emergency department for worsening symptoms. The treatment plan was discussed with the patient who demonstrated understanding and comfort with plan.    This note was created using [employer] Fluency Direct. There may be typographical errors secondary to dictation.                                    Clinical Impression:     1. Viral syndrome         Disposition:   Disposition: Discharged  Condition: Stable                     Elvira Sahni PA-C  02/05/20 [id number]

## 2020-02-06 NOTE — ED TRIAGE NOTES
"Pt arrives to ED with c/o "flu like symptoms". Pt reports nausea vomitting, abdominal discomfort, headache. Pt denies use of medications for relief of pain. Pt lying in bed, respirations even, unlabored, eyes open spontaneously, NAD noted, AAOx4, answering questions appropriately.   "

## 2021-08-26 LAB — PAP RECOMMENDATION EXT: NORMAL

## 2022-01-05 ENCOUNTER — LAB VISIT (OUTPATIENT)
Dept: PRIMARY CARE CLINIC | Facility: CLINIC | Age: 27
End: 2022-01-05
Payer: MEDICAID

## 2022-01-05 DIAGNOSIS — Z20.822 CONTACT WITH AND (SUSPECTED) EXPOSURE TO COVID-19: ICD-10-CM

## 2022-01-05 LAB
CTP QC/QA: YES
SARS-COV-2 AG RESP QL IA.RAPID: NEGATIVE

## 2022-01-05 PROCEDURE — 87811 SARS-COV-2 COVID19 W/OPTIC: CPT

## 2022-07-25 ENCOUNTER — HOSPITAL ENCOUNTER (EMERGENCY)
Facility: OTHER | Age: 27
Discharge: HOME | End: 2022-07-25
Attending: EMERGENCY MEDICINE
Payer: MEDICAID

## 2022-07-25 VITALS
SYSTOLIC BLOOD PRESSURE: 135 MMHG | OXYGEN SATURATION: 100 % | HEIGHT: 64 IN | BODY MASS INDEX: 35.85 KG/M2 | TEMPERATURE: 99 F | RESPIRATION RATE: 18 BRPM | DIASTOLIC BLOOD PRESSURE: 75 MMHG | WEIGHT: 210 LBS | HEART RATE: 101 BPM

## 2022-07-25 DIAGNOSIS — M54.50 ACUTE BILATERAL LOW BACK PAIN WITHOUT SCIATICA: ICD-10-CM

## 2022-07-25 DIAGNOSIS — R00.0 TACHYCARDIA: ICD-10-CM

## 2022-07-25 DIAGNOSIS — N30.00 ACUTE CYSTITIS WITHOUT HEMATURIA: Primary | ICD-10-CM

## 2022-07-25 LAB
ANION GAP SERPL CALC-SCNC: 12 MMOL/L (ref 8–16)
B-HCG UR QL: NEGATIVE
BACTERIA #/AREA URNS HPF: ABNORMAL /HPF
BASOPHILS # BLD AUTO: 0.05 K/UL (ref 0–0.2)
BASOPHILS NFR BLD: 0.3 % (ref 0–1.9)
BILIRUB UR QL STRIP: NEGATIVE
BUN SERPL-MCNC: 5 MG/DL (ref 6–20)
CALCIUM SERPL-MCNC: 8.4 MG/DL (ref 8.7–10.5)
CHLORIDE SERPL-SCNC: 105 MMOL/L (ref 95–110)
CLARITY UR: ABNORMAL
CO2 SERPL-SCNC: 22 MMOL/L (ref 23–29)
COLOR UR: YELLOW
CREAT SERPL-MCNC: 0.8 MG/DL (ref 0.5–1.4)
CTP QC/QA: YES
CTP QC/QA: YES
DIFFERENTIAL METHOD: ABNORMAL
EOSINOPHIL # BLD AUTO: 0 K/UL (ref 0–0.5)
EOSINOPHIL NFR BLD: 0.1 % (ref 0–8)
ERYTHROCYTE [DISTWIDTH] IN BLOOD BY AUTOMATED COUNT: 13.9 % (ref 11.5–14.5)
EST. GFR  (AFRICAN AMERICAN): >60 ML/MIN/1.73 M^2
EST. GFR  (NON AFRICAN AMERICAN): >60 ML/MIN/1.73 M^2
GLUCOSE SERPL-MCNC: 145 MG/DL (ref 70–110)
GLUCOSE UR QL STRIP: NEGATIVE
HCT VFR BLD AUTO: 34.8 % (ref 37–48.5)
HGB BLD-MCNC: 11.8 G/DL (ref 12–16)
HGB UR QL STRIP: ABNORMAL
HYALINE CASTS #/AREA URNS LPF: 0 /LPF
IMM GRANULOCYTES # BLD AUTO: 0.09 K/UL (ref 0–0.04)
IMM GRANULOCYTES NFR BLD AUTO: 0.6 % (ref 0–0.5)
KETONES UR QL STRIP: NEGATIVE
LACTATE SERPL-SCNC: 2.3 MMOL/L (ref 0.5–2.2)
LEUKOCYTE ESTERASE UR QL STRIP: ABNORMAL
LYMPHOCYTES # BLD AUTO: 0.9 K/UL (ref 1–4.8)
LYMPHOCYTES NFR BLD: 6.4 % (ref 18–48)
MAGNESIUM SERPL-MCNC: 1.6 MG/DL (ref 1.6–2.6)
MCH RBC QN AUTO: 27.6 PG (ref 27–31)
MCHC RBC AUTO-ENTMCNC: 33.9 G/DL (ref 32–36)
MCV RBC AUTO: 81 FL (ref 82–98)
MICROSCOPIC COMMENT: ABNORMAL
MONOCYTES # BLD AUTO: 0.9 K/UL (ref 0.3–1)
MONOCYTES NFR BLD: 6.4 % (ref 4–15)
NEUTROPHILS # BLD AUTO: 12.6 K/UL (ref 1.8–7.7)
NEUTROPHILS NFR BLD: 86.2 % (ref 38–73)
NITRITE UR QL STRIP: POSITIVE
NRBC BLD-RTO: 0 /100 WBC
PH UR STRIP: 7 [PH] (ref 5–8)
PLATELET # BLD AUTO: 287 K/UL (ref 150–450)
PMV BLD AUTO: 10.8 FL (ref 9.2–12.9)
POTASSIUM SERPL-SCNC: 3.7 MMOL/L (ref 3.5–5.1)
PROT UR QL STRIP: ABNORMAL
RBC # BLD AUTO: 4.28 M/UL (ref 4–5.4)
RBC #/AREA URNS HPF: >100 /HPF (ref 0–4)
SARS-COV-2 RDRP RESP QL NAA+PROBE: NEGATIVE
SODIUM SERPL-SCNC: 139 MMOL/L (ref 136–145)
SP GR UR STRIP: 1.01 (ref 1–1.03)
SQUAMOUS #/AREA URNS HPF: 3 /HPF
TSH SERPL DL<=0.005 MIU/L-ACNC: 0.58 UIU/ML (ref 0.4–4)
URN SPEC COLLECT METH UR: ABNORMAL
UROBILINOGEN UR STRIP-ACNC: 1 EU/DL
WBC # BLD AUTO: 14.6 K/UL (ref 3.9–12.7)
WBC #/AREA URNS HPF: >100 /HPF (ref 0–5)
WBC CLUMPS URNS QL MICRO: ABNORMAL

## 2022-07-25 PROCEDURE — 87186 SC STD MICRODIL/AGAR DIL: CPT | Performed by: EMERGENCY MEDICINE

## 2022-07-25 PROCEDURE — 96375 TX/PRO/DX INJ NEW DRUG ADDON: CPT

## 2022-07-25 PROCEDURE — 81025 URINE PREGNANCY TEST: CPT | Performed by: EMERGENCY MEDICINE

## 2022-07-25 PROCEDURE — 93010 EKG 12-LEAD: ICD-10-PCS | Mod: ,,, | Performed by: INTERNAL MEDICINE

## 2022-07-25 PROCEDURE — 81000 URINALYSIS NONAUTO W/SCOPE: CPT | Performed by: EMERGENCY MEDICINE

## 2022-07-25 PROCEDURE — 83735 ASSAY OF MAGNESIUM: CPT | Performed by: EMERGENCY MEDICINE

## 2022-07-25 PROCEDURE — 85025 COMPLETE CBC W/AUTO DIFF WBC: CPT | Performed by: EMERGENCY MEDICINE

## 2022-07-25 PROCEDURE — 96365 THER/PROPH/DIAG IV INF INIT: CPT

## 2022-07-25 PROCEDURE — 63600175 PHARM REV CODE 636 W HCPCS: Performed by: EMERGENCY MEDICINE

## 2022-07-25 PROCEDURE — 87086 URINE CULTURE/COLONY COUNT: CPT | Performed by: EMERGENCY MEDICINE

## 2022-07-25 PROCEDURE — 84443 ASSAY THYROID STIM HORMONE: CPT | Performed by: EMERGENCY MEDICINE

## 2022-07-25 PROCEDURE — 80048 BASIC METABOLIC PNL TOTAL CA: CPT | Performed by: EMERGENCY MEDICINE

## 2022-07-25 PROCEDURE — 25000003 PHARM REV CODE 250: Performed by: EMERGENCY MEDICINE

## 2022-07-25 PROCEDURE — 87088 URINE BACTERIA CULTURE: CPT | Performed by: EMERGENCY MEDICINE

## 2022-07-25 PROCEDURE — U0002 COVID-19 LAB TEST NON-CDC: HCPCS | Performed by: EMERGENCY MEDICINE

## 2022-07-25 PROCEDURE — 93010 ELECTROCARDIOGRAM REPORT: CPT | Mod: ,,, | Performed by: INTERNAL MEDICINE

## 2022-07-25 PROCEDURE — 96361 HYDRATE IV INFUSION ADD-ON: CPT

## 2022-07-25 PROCEDURE — 87077 CULTURE AEROBIC IDENTIFY: CPT | Performed by: EMERGENCY MEDICINE

## 2022-07-25 PROCEDURE — 83605 ASSAY OF LACTIC ACID: CPT | Performed by: EMERGENCY MEDICINE

## 2022-07-25 PROCEDURE — 99285 EMERGENCY DEPT VISIT HI MDM: CPT | Mod: 25

## 2022-07-25 PROCEDURE — 93005 ELECTROCARDIOGRAM TRACING: CPT

## 2022-07-25 RX ORDER — IBUPROFEN 600 MG/1
600 TABLET ORAL EVERY 8 HOURS PRN
Qty: 20 TABLET | Refills: 0 | Status: SHIPPED | OUTPATIENT
Start: 2022-07-25 | End: 2022-08-01

## 2022-07-25 RX ORDER — KETOROLAC TROMETHAMINE 30 MG/ML
10 INJECTION, SOLUTION INTRAMUSCULAR; INTRAVENOUS
Status: COMPLETED | OUTPATIENT
Start: 2022-07-25 | End: 2022-07-25

## 2022-07-25 RX ORDER — IBUPROFEN 400 MG/1
800 TABLET ORAL
Status: COMPLETED | OUTPATIENT
Start: 2022-07-25 | End: 2022-07-25

## 2022-07-25 RX ORDER — SODIUM CHLORIDE 9 MG/ML
1000 INJECTION, SOLUTION INTRAVENOUS
Status: COMPLETED | OUTPATIENT
Start: 2022-07-25 | End: 2022-07-25

## 2022-07-25 RX ORDER — ACETAMINOPHEN 500 MG
1000 TABLET ORAL
Status: COMPLETED | OUTPATIENT
Start: 2022-07-25 | End: 2022-07-25

## 2022-07-25 RX ORDER — CEPHALEXIN 500 MG/1
500 CAPSULE ORAL 4 TIMES DAILY
Qty: 20 CAPSULE | Refills: 0 | Status: SHIPPED | OUTPATIENT
Start: 2022-07-25 | End: 2022-07-30

## 2022-07-25 RX ORDER — ONDANSETRON 8 MG/1
8 TABLET, ORALLY DISINTEGRATING ORAL EVERY 6 HOURS PRN
Qty: 15 TABLET | Refills: 0 | OUTPATIENT
Start: 2022-07-25 | End: 2022-10-31

## 2022-07-25 RX ADMIN — SODIUM CHLORIDE 1000 ML: 0.9 INJECTION, SOLUTION INTRAVENOUS at 04:07

## 2022-07-25 RX ADMIN — IBUPROFEN 800 MG: 400 TABLET, FILM COATED ORAL at 06:07

## 2022-07-25 RX ADMIN — CEFTRIAXONE 1 G: 1 INJECTION, SOLUTION INTRAVENOUS at 04:07

## 2022-07-25 RX ADMIN — ACETAMINOPHEN 1000 MG: 500 TABLET, FILM COATED ORAL at 03:07

## 2022-07-25 RX ADMIN — KETOROLAC TROMETHAMINE 10 MG: 30 INJECTION, SOLUTION INTRAMUSCULAR; INTRAVENOUS at 04:07

## 2022-07-25 RX ADMIN — SODIUM CHLORIDE 1000 ML: 0.9 INJECTION, SOLUTION INTRAVENOUS at 06:07

## 2022-07-25 NOTE — ED NOTES
"Pt presents with c/o sudden onset left sided flank pain, HA and right sided neck pain x1 hr. Pt states pain began while sitting down and denies any trauma/injury. Pt reports "knot" on the right side of her neck x1 month but states pain began tonight. Pt denies gu symptoms. No meds taken pta. Pt denies current n/v/d but reports diarrhea yesterday.   "

## 2022-07-25 NOTE — Clinical Note
"Nicolasa "Shawn Peter was seen and treated in our emergency department on 7/25/2022.  She may return to work on 07/27/2022.       If you have any questions or concerns, please don't hesitate to call.      Christine Pickering MD"

## 2022-07-25 NOTE — ED PROVIDER NOTES
"Encounter Date: 7/25/2022    SCRIBE #1 NOTE: I, Catalina Alexander, am scribing for, and in the presence of, Ariana Bob MD.       History     Chief Complaint   Patient presents with    Back Pain     Sudden onset thoracic & lower region back pain x 1 hour.      This is a 26 y.o. female who presents with complaint of back pain x 1 hour. 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 She adds that she "got a headache and felt very weak" following the onset of her symptoms. She also adds that she feels dizzy when she stands and her neck hurts "every time (she) goes to turn around." Pt reports having diarrhea yesterday. She denies chest pain or abdominal pain. She also denies taking any medication for the pain. Pt is COVID-19 vaccinated and has never tested positive.      The history is provided by the patient.     Review of patient's allergies indicates:  No Known Allergies  No past medical history on file.  Past Surgical History:   Procedure Laterality Date    ERCP N/A 11/29/2018    Procedure: ERCP (ENDOSCOPIC RETROGRADE CHOLANGIOPANCREATOGRAPHY);  Surgeon: Gabriel Agudelo MD;  Location: [facility];  Service: Endoscopy;  Laterality: N/A;    LAPAROSCOPIC CHOLECYSTECTOMY N/A 11/26/2018    Procedure: CHOLECYSTECTOMY, LAPAROSCOPIC (ADD ON);  Surgeon: Marcio Gonzales Jr., MD;  Location: [facility];  Service: General;  Laterality: N/A;  (ADD ON)     No family history on file.  Social History     Tobacco Use    Smoking status: Never Smoker   Substance Use Topics    Alcohol use: No    Drug use: No     Review of Systems   Constitutional: Negative for fever.   HENT: Negative for sore throat.    Respiratory: Negative for shortness of breath.    Cardiovascular: Negative for chest pain.   Gastrointestinal: Positive for diarrhea. Negative for abdominal pain, nausea and vomiting.   Genitourinary: Negative for dysuria.   Musculoskeletal: Positive " for back pain and neck pain.   Skin: Negative for rash.   Neurological: Positive for dizziness, weakness (generalized) and headaches.   Hematological: Does not bruise/bleed easily.       Physical Exam     Initial Vitals [07/25/22 0321]   BP Pulse Resp Temp SpO2   135/75 (!) 120 18 98.9 °F (37.2 °C) 100 %      MAP       --         Physical Exam    Constitutional: She appears well-developed and well-nourished.   HENT:   Head: Normocephalic and atraumatic.   Eyes: EOM are normal.   Neck:   Normal range of motion.  Cardiovascular: Regular rhythm, normal heart sounds and intact distal pulses.   Pulses:       Radial pulses are 2+ on the right side and 2+ on the left side.        Dorsalis pedis pulses are 2+ on the right side and 2+ on the left side.   Tachycardic.   Musculoskeletal:         General: Normal range of motion.      Cervical back: Normal range of motion.      Comments: Strength 5/5.      Neurological: She is alert and oriented to person, place, and time.   Gait normal.   Skin: Skin is warm and dry.   Psychiatric: She has a normal mood and affect. Thought content normal.         ED Course   Procedures  Labs Reviewed   URINALYSIS, REFLEX TO URINE CULTURE - Abnormal; Notable for the following components:       Result Value    Appearance, UA Cloudy (*)     Protein, UA 1+ (*)     Occult Blood UA 2+ (*)     Nitrite, UA Positive (*)     Leukocytes, UA 3+ (*)     All other components within normal limits    Narrative:     Specimen Source->Urine   URINALYSIS MICROSCOPIC - Abnormal; Notable for the following components:    RBC, UA >100 (*)     WBC, UA >100 (*)     WBC Clumps, UA Few (*)     Bacteria Many (*)     All other components within normal limits    Narrative:     Specimen Source->Urine   CBC W/ AUTO DIFFERENTIAL - Abnormal; Notable for the following components:    WBC 14.60 (*)     Hemoglobin 11.8 (*)     Hematocrit 34.8 (*)     MCV 81 (*)     Immature Granulocytes 0.6 (*)     Gran # (ANC) 12.6 (*)     Immature  Grans (Abs) 0.09 (*)     Lymph # 0.9 (*)     Gran % 86.2 (*)     Lymph % 6.4 (*)     All other components within normal limits   LACTIC ACID, PLASMA - Abnormal; Notable for the following components:    Lactate (Lactic Acid) 2.3 (*)     All other components within normal limits   BASIC METABOLIC PANEL - Abnormal; Notable for the following components:    CO2 22 (*)     Glucose 145 (*)     BUN 5 (*)     Calcium 8.4 (*)     All other components within normal limits   CULTURE, URINE   TSH   MAGNESIUM   SARS-COV-2 RDRP GENE    Narrative:     This test utilizes isothermal nucleic acid amplification   technology to detect the SARS-CoV-2 RdRp nucleic acid segment.   The analytical sensitivity (limit of detection) is 125 genome   equivalents/mL.   A POSITIVE result implies infection with the SARS-CoV-2 virus;   the patient is presumed to be contagious.     A NEGATIVE result means that SARS-CoV-2 nucleic acids are not   present above the limit of detection. A NEGATIVE result should be   treated as presumptive. It does not rule out the possibility of   COVID-19 and should not be the sole basis for treatment decisions.   If COVID-19 is strongly suspected based on clinical and exposure   history, re-testing using an alternate molecular assay should be   considered.   This test is only for use under the Food and Drug   Administration s Emergency Use Authorization (EUA).   Commercial kits are provided by [employer].   Performance characteristics of the EUA have been independently   verified by [facility] Department of   Pathology and Laboratory Medicine.   _________________________________________________________________   The authorized Fact Sheet for Healthcare Providers and the authorized Fact   Sheet for Patients of the ID NOW COVID-19 are available on the FDA   website:     https://www.fda.gov/media/155233/download  https://www.fda.gov/media/584424/download          POCT URINE PREGNANCY     EKG Readings:  (Independently Interpreted)   Sinus tachycardic at 130. No significant ST or T wave changes.     ECG Results          EKG 12-lead (Final result)  Result time 07/25/22 07:50:43    Final result by Interface, Lab In [facility] (07/25/22 07:50:43)                 Narrative:    Test Reason : R00.0,    Vent. Rate : 130 BPM     Atrial Rate : 130 BPM     P-R Int : 104 ms          QRS Dur : 080 ms      QT Int : 402 ms       P-R-T Axes : 000 067 058 degrees     QTc Int : 591 ms    Sinus tachycardia  Nonspecific T wave abnormality    Confirmed by José Antonio Parnell MD ([id number]) on 7/25/2022 7:50:32 AM    Referred By: [email or web address]   SELF           Confirmed By:José Antonio Parnell MD                            Imaging Results          CT Renal Stone Study ABD Pelvis WO (Final result)  Result time 07/25/22 07:45:29    Final result by Abhi Chino MD (07/25/22 07:45:29)                 Impression:      No obstructive uropathy.  No calcified nephroureterolithiasis.    Cholecystectomy.  Pneumobilia likely relating to patulous ampulla.      Electronically signed by: Abhi Chino MD  Date:    07/25/2022  Time:    07:45             Narrative:    EXAMINATION:  CT RENAL STONE STUDY ABD PELVIS WO    CLINICAL HISTORY:  Flank pain, kidney stone suspected;    TECHNIQUE:  Low dose axial images, sagittal and coronal reformations were obtained from the lung bases to the pubic symphysis.  Contrast was not administered.    COMPARISON:  None    FINDINGS:  Heart: Normal in size. No pericardial effusion.    Lung Bases: Well aerated, without consolidation or pleural fluid.    Liver: Normal in size and attenuation, with no focal hepatic lesions.    Gallbladder: Cholecystectomy with surgical clips.    Bile Ducts: No evidence of dilated ducts.  Pneumobilia likely relating to patulous sphincter.    Pancreas: No mass or peripancreatic fat stranding.    Spleen: Unremarkable.    Adrenals: Unremarkable.    Kidneys/ Ureters: 1.4 cm low-attenuation focus RIGHT  kidney, likely RIGHT renal cyst.  Ultrasound could confirm.  No evidence for renal obstruction or nephrolithiasis..    Bladder: No evidence of wall thickening.    Reproductive organs: Unremarkable.    GI Tract/Mesentery: No evidence of bowel obstruction or inflammation. Appendix is not identifiable but there is no infllammation in the expected region of the appendix.    Peritoneal Space: No ascites. No free air.    Retroperitoneum: No significant adenopathy.    Abdominal wall: Unremarkable.    Vasculature: No significant atherosclerosis or aneurysm.    Bones: No acute fracture.                                 Medications   0.9%  NaCl infusion (0 mLs Intravenous Stopped 7/25/22 0443)   ketorolac injection 9.999 mg (9.999 mg Intravenous Given 7/25/22 0403)   acetaminophen tablet 1,000 mg (1,000 mg Oral Given 7/25/22 0358)   cefTRIAXone (ROCEPHIN) 1 g/50 mL D5W IVPB (0 g Intravenous Stopped 7/25/22 0530)   0.9%  NaCl infusion (0 mLs Intravenous Stopped 7/25/22 0805)   ibuprofen tablet 800 mg (800 mg Oral Given 7/25/22 0648)     Medical Decision Making:   History:   Old Medical Records: I decided to obtain old medical records.  Old Records Summarized: records from another hospital.       <> Summary of Records: Patient was seen in the emergency department at [facility] proximally 24 hours ago with complaint of rhinorrhea, cough, and diarrhea x2 days.  Her COVID test was negative.  Vital signs including heart rate were within normal limits.  Her urinalysis was also normal.  She was discharged with prescriptions for Lomotil, Zofran, and Tylenol.  Independently Interpreted Test(s):   I have ordered and independently interpreted EKG Reading(s) - see prior notes  Clinical Tests:   Lab Tests: Ordered and Reviewed  Medical Tests: Ordered and Reviewed          Scribe Attestation:   Scribe #1: I performed the above scribed service and the documentation accurately describes the services I performed. I attest to the accuracy of the  note.        ED Course as of 07/25/22 0534   Mon Jul 25, 2022   0525 26-year-old healthy female presents with complaint of sudden onset of mid to low her back pain approximately an hour prior to arrival.  Triage vital signs significant for tachycardia but otherwise within normal limits.  On exam she appeared very uncomfortable.  The pain could not be reproduced on exam.    COVID test was negative.  EKG consistent with sinus tachycardia.  She received IV fluids, Tylenol and Toradol.  On reassessment she reported improvement in her pain and is currently rating it 4/10 but remains tachycardic with a heart rate in the 120s.    She denied any urinary symptoms but requested a UA.  UA is consistent with a UTI.  I don't believe this is causing of all of her sx, but she did receive a dose of Rocephin.  Given the persistent tachycardia, I will obtain lab work including lactic acid, CBC, BMP, and TSH. [AA]      ED Course User Index  [AA] Ariana Bob MD             Physician Attestation for Scribe: I, Ariana Bob  , reviewed documentation as scribed in my presence, which is both accurate and complete.    Clinical Impression:   Final diagnoses:  [R00.0] Tachycardia  [N30.00] Acute cystitis without hematuria (Primary)  [M54.50] Acute bilateral low back pain without sciatica          ED Disposition Condition    Discharge Stable        ED Prescriptions     Medication Sig Dispense Start Date End Date Auth. Provider    cephALEXin (KEFLEX) 500 MG capsule Take 1 capsule (500 mg total) by mouth 4 (four) times daily. for 5 days 20 capsule 7/25/2022 7/30/2022 Ariana Bob MD    ibuprofen (ADVIL,MOTRIN) 600 MG tablet Take 1 tablet (600 mg total) by mouth every 8 (eight) hours as needed for Pain. 20 tablet 7/25/2022 8/1/2022 Ariana Bob MD    ondansetron (ZOFRAN-ODT) 8 MG TbDL Take 1 tablet (8 mg total) by mouth every 6 (six) hours as needed (nausea). 15 tablet 7/25/2022  Ariana Bob MD        Follow-up Information     Follow up With  Specialties Details Why Contact Info    Edgar Vega MD Obstetrics Schedule an appointment as soon as possible for a visit on 7/27/2022  [address] [facility] [address]  [phone]      [facility] - [facility] Dept Emergency Medicine Go to  If symptoms worsen [address] [address]  [phone]           Ariana Bob MD  07/25/22 0773

## 2022-07-27 LAB — BACTERIA UR CULT: ABNORMAL

## 2022-10-31 ENCOUNTER — HOSPITAL ENCOUNTER (EMERGENCY)
Facility: OTHER | Age: 27
Discharge: HOME | End: 2022-10-31
Attending: EMERGENCY MEDICINE
Payer: MEDICAID

## 2022-10-31 VITALS
DIASTOLIC BLOOD PRESSURE: 71 MMHG | RESPIRATION RATE: 19 BRPM | BODY MASS INDEX: 33.32 KG/M2 | HEIGHT: 65 IN | OXYGEN SATURATION: 98 % | HEART RATE: 88 BPM | SYSTOLIC BLOOD PRESSURE: 132 MMHG | WEIGHT: 200 LBS | TEMPERATURE: 98 F

## 2022-10-31 DIAGNOSIS — S00.83XA CONTUSION OF FOREHEAD, INITIAL ENCOUNTER: Primary | ICD-10-CM

## 2022-10-31 LAB
B-HCG UR QL: NEGATIVE
CTP QC/QA: YES
POC MOLECULAR INFLUENZA A AGN: NEGATIVE
POC MOLECULAR INFLUENZA B AGN: NEGATIVE
SARS-COV-2 RDRP RESP QL NAA+PROBE: NEGATIVE

## 2022-10-31 PROCEDURE — 81025 URINE PREGNANCY TEST: CPT | Performed by: EMERGENCY MEDICINE

## 2022-10-31 PROCEDURE — 25000003 PHARM REV CODE 250: Performed by: EMERGENCY MEDICINE

## 2022-10-31 PROCEDURE — 99284 EMERGENCY DEPT VISIT MOD MDM: CPT

## 2022-10-31 PROCEDURE — 87635 SARS-COV-2 COVID-19 AMP PRB: CPT | Performed by: EMERGENCY MEDICINE

## 2022-10-31 RX ORDER — IBUPROFEN 800 MG/1
800 TABLET ORAL EVERY 6 HOURS PRN
Qty: 30 TABLET | Refills: 0 | OUTPATIENT
Start: 2022-10-31 | End: 2022-11-21

## 2022-10-31 RX ORDER — IBUPROFEN 400 MG/1
800 TABLET ORAL
Status: COMPLETED | OUTPATIENT
Start: 2022-10-31 | End: 2022-10-31

## 2022-10-31 RX ORDER — ONDANSETRON 4 MG/1
4 TABLET, ORALLY DISINTEGRATING ORAL
Status: COMPLETED | OUTPATIENT
Start: 2022-10-31 | End: 2022-10-31

## 2022-10-31 RX ORDER — ONDANSETRON 4 MG/1
4 TABLET, ORALLY DISINTEGRATING ORAL EVERY 6 HOURS PRN
Qty: 10 TABLET | Refills: 0 | OUTPATIENT
Start: 2022-10-31 | End: 2022-11-21

## 2022-10-31 RX ADMIN — IBUPROFEN 800 MG: 400 TABLET, FILM COATED ORAL at 10:10

## 2022-10-31 RX ADMIN — ONDANSETRON 4 MG: 4 TABLET, ORALLY DISINTEGRATING ORAL at 10:10

## 2022-10-31 NOTE — ED NOTES
Dr. Zuñiga not at his desk, charge nurse Cathy made aware of pt's disagreement with care received in the er, requesting second opinion, disagreeing with diagnosis, prescription prescribed at discharge, noted charge nurse Morgan walking into pt's room to speak to pt

## 2022-10-31 NOTE — ED NOTES
"During discharge instructions pt ask " so how do I complaint about a doctor" informed of pt's relations and that I provide her with the information.  I proceed to go to nurse's desk and contact pt's relations Mrs. Reyes, informed of situation, I go to pt and give pt the option  Mrs. Reyes with pt's relations can come to her bedside to speak to her now or she can call her, pt reports her ride is on her way and that she will contact Mrs. Reyes on her on time. I walk pt out of the ER and apologies given to her for her not being pleased with the care [facility] has provided. Pt verbalized understanding   "

## 2022-10-31 NOTE — ED NOTES
"1ST CONTACT WITH PT FOR DISCHARGE INSTRUCTION, UPON WALKING IN ROOM, LYING SEMI SERRANO, UPON INFORMED OF DISCHARGE HOME AND THAT I AM HER TO PROVIDE DISCHARGE INSTRUCTION, PT STATES " I ASK FOR A SECOND OPINION" PT DISAGREED WITH DISCHARGE DIAGNOSIS, DISCHARGE MEDICATIONS, PT STATES " I DON'T FEEL WELL"" I CANT KEEP NOTHING DOWN, I CAN'T SLEEP, FLASHES IN MY EYES" " I'M TELLING YOU, HE DIDN'T TOUCH ME!" " I'M NOT STABLE" PT REPORTS SHE HAS BEEN TAKEN IBUPROFEN AND IT HAS NOT BEEN RELIEVING HER PAIN, PT IS C/O PAIN TO R SIDED HEAD, S/P BEING HIT WITH A CAN OF TOMATO TO HEAD 5 DAYS AGO, NEG LOC, APOLOGIES GIVEN TO PT AND INFORMED I WILL NOTIFY THE DOCTOR, PT VERBALIZED UNDERSTANDING, A&Ox3, in no distress, agitated, skin dry, warm, resp even non labored,   "

## 2022-10-31 NOTE — ED NOTES
Charge nurse Cathy reports provider has been informed of pt's complaints/request and he has placed new orders for pt

## 2022-10-31 NOTE — ED PROVIDER NOTES
"Encounter Date: 10/31/2022    SCRIBE #1 NOTE: I, Sara Solano, am scribing for, and in the presence of,  Raymond Zuñiga II.     History     Chief Complaint   Patient presents with    Headache     Patient states " last week I opened a cabinet and a tomato can fell on my head , now I see flashes " . Patient denies LOC . Reports intermittent n/v since yesterday      Time seen by provider: 9:13 AM    This is a 27 y.o. female who presents with complaint of headache after a can fell on her head from the top shelf 3 days ago. The patient reports that the pain started yesterday and that her frontal head swelling is getting bigger. She states experiencing vomiting, nausea, and chills. She notes that she is seeing flashing lights in both eyes. She denies associated blurry vision, fever, body aches, cough, or any sick contacts. Of note, the patient has been taking Acetaminophen for the pain. She denies taking any prescription medications or having any PMHx.       The history is provided by the patient.   Review of patient's allergies indicates:  No Known Allergies  No past medical history on file.  Past Surgical History:   Procedure Laterality Date    ERCP N/A 11/29/2018    Procedure: ERCP (ENDOSCOPIC RETROGRADE CHOLANGIOPANCREATOGRAPHY);  Surgeon: Gabriel Agudelo MD;  Location: [facility];  Service: Endoscopy;  Laterality: N/A;    LAPAROSCOPIC CHOLECYSTECTOMY N/A 11/26/2018    Procedure: CHOLECYSTECTOMY, LAPAROSCOPIC (ADD ON);  Surgeon: Marcio Gonzales Jr., MD;  Location: [facility];  Service: General;  Laterality: N/A;  (ADD ON)     No family history on file.  Social History     Tobacco Use    Smoking status: Never   Substance Use Topics    Alcohol use: No    Drug use: No     Review of Systems   Constitutional:  Positive for chills. Negative for diaphoresis and fever.   Eyes:  Negative for photophobia and visual disturbance.   Respiratory:  Negative for cough and shortness of breath.    Cardiovascular:  Negative for chest " "pain and leg swelling.   Gastrointestinal:  Positive for nausea and vomiting. Negative for abdominal pain, blood in stool, constipation and diarrhea.   Genitourinary:  Negative for dysuria, flank pain, frequency, hematuria and urgency.   Musculoskeletal:  Negative for myalgias, neck pain and neck stiffness.   Skin:  Negative for rash and wound.   Neurological:  Positive for headaches. Negative for weakness, light-headedness and numbness.        Positive for "seeing flashing lights". Negative for blurry vision.   Psychiatric/Behavioral:  Negative for confusion and suicidal ideas.      Physical Exam     Initial Vitals [10/31/22 0833]   BP Pulse Resp Temp SpO2   (!) 129/59 92 16 99.5 °F (37.5 °C) (!) 89 %      MAP       --         Physical Exam    Nursing note and vitals reviewed.  Constitutional: She appears well-developed and well-nourished. She is not diaphoretic. No distress.   HENT:   Head: Normocephalic and atraumatic.   Moist mucous membranes. No appreciable soft tissue swelling but tenderness over the right anterior forehead just inferior to hairline. No abrasions or ecchymosis. No other cranial facial trauma.   Eyes: EOM are normal. Pupils are equal, round, and reactive to light.   No pallor or icterus. Pupils are 4 mm.    Neck: Neck supple.   Normal range of motion.  Musculoskeletal:         General: No tenderness or edema. Normal range of motion.      Cervical back: Normal range of motion and neck supple.     Lymphadenopathy:     She has no cervical adenopathy.   Neurological: She is alert and oriented to person, place, and time.   Skin: Skin is warm and dry.   Psychiatric: She has a normal mood and affect. Her behavior is normal. Judgment and thought content normal.       ED Course   Procedures  Labs Reviewed   POCT URINE PREGNANCY   SARS-COV-2 RDRP GENE   POCT INFLUENZA A/B MOLECULAR          Imaging Results    None          Medications   ibuprofen tablet 800 mg (800 mg Oral Given 10/31/22 1013) "   ondansetron disintegrating tablet 4 mg (4 mg Oral Given 10/31/22 1012)     Medical Decision Making:   History:   Old Medical Records: I decided to obtain old medical records.  Clinical Tests:   Lab Tests: Ordered and Reviewed     Patient presents complaining of headache worse on the right frontal region and increasing swelling the forehead.  Patient relates this to having a can of tomato paste fall from a shelf above her striking her in the forehead 4 days ago.  There was no loss of consciousness.  Patient reports that she was fine for the 1st 24 hours but then started developing the headache and swelling.  Reports nausea and an episode of vomiting.  However on my exam there is no soft tissue swelling visible to the examiner, there is no abrasion ecchymosis or other signs of craniofacial trauma.  The patient has no focal deficits, difficulty with ambulation.  She exhibits no amnesia or confusion.  She has not had any vomiting during her emergency department stay.  I explained at length to the patient that I do not think that this minor trauma would be a risk for skull fracture, concussion, brain injury etc. and I do not think CT scan is warranted.  The patient became very upset, repeatedly insisting that she have a CT scan.  However the patient does not meet criteria for CT scan by any of the common decision rules, and I do not think this amount of radiation exposure is appropriate for the clinical picture.  As the patient is reporting nausea vomiting malaise headache I did suggest that we rule her out for influenza which is currently very active in the community, she initially refused but then allowed us 2.  COVID and flu were negative.  Recommend treating the symptoms with anti-inflammatories, antiemetics.  The patient still is very upset that she did not receive a CT scan, charge nurse also was enlisted to help explain to the patient the medical reasoning behind this decision.  Patient was given discharge  instructions and return precautions.\  Of note the patient had an initial O2 sat of 89% at triage I suspect this was spurious as 2 minutes later and there after her pulse ox remained normal       Scribe Attestation:   Scribe #1: I performed the above scribed service and the documentation accurately describes the services I performed. I attest to the accuracy of the note.          Physician Attestation for Scribe: I, TL, reviewed documentation as scribed in my presence, which is both accurate and complete.           Clinical Impression:   Final diagnoses:  [S00.83XA] Contusion of forehead, initial encounter (Primary)        ED Disposition Condition    Discharge Stable          ED Prescriptions       Medication Sig Dispense Start Date End Date Auth. Provider    ondansetron (ZOFRAN-ODT) 4 MG TbDL Take 1 tablet (4 mg total) by mouth every 6 (six) hours as needed. 10 tablet 10/31/2022 -- Raymond Zuñiga II, MD    ibuprofen (ADVIL,MOTRIN) 800 MG tablet Take 1 tablet (800 mg total) by mouth every 6 (six) hours as needed for Pain. 30 tablet 10/31/2022 -- Raymond Zuñiga II, MD          Follow-up Information       Follow up With Specialties Details Why Contact Info    Edgar Vega MD Obstetrics In 5 days  [address] [facility] [address]  [phone]               Raymond Zuñiga II, MD  10/31/22 6749

## 2022-10-31 NOTE — ED TRIAGE NOTES
Pt arrived to ED with c/o headaches after being hit in head with a can of tomato paste last week at work. Pt reports taking tylenol and ibuprofen. Pt denies LOC. Pt AAOx4, NAD noted.

## 2022-11-21 ENCOUNTER — HOSPITAL ENCOUNTER (EMERGENCY)
Facility: OTHER | Age: 27
Discharge: HOME | End: 2022-11-21
Attending: EMERGENCY MEDICINE
Payer: MEDICAID

## 2022-11-21 VITALS
HEIGHT: 65 IN | RESPIRATION RATE: 16 BRPM | SYSTOLIC BLOOD PRESSURE: 108 MMHG | WEIGHT: 200 LBS | DIASTOLIC BLOOD PRESSURE: 52 MMHG | TEMPERATURE: 98 F | OXYGEN SATURATION: 95 % | HEART RATE: 97 BPM | BODY MASS INDEX: 33.32 KG/M2

## 2022-11-21 DIAGNOSIS — U07.1 COVID-19: Primary | ICD-10-CM

## 2022-11-21 DIAGNOSIS — E86.0 DEHYDRATION: ICD-10-CM

## 2022-11-21 LAB
CTP QC/QA: YES
CTP QC/QA: YES
POC MOLECULAR INFLUENZA A AGN: NEGATIVE
POC MOLECULAR INFLUENZA B AGN: NEGATIVE
SARS-COV-2 RDRP RESP QL NAA+PROBE: POSITIVE

## 2022-11-21 PROCEDURE — 96374 THER/PROPH/DIAG INJ IV PUSH: CPT

## 2022-11-21 PROCEDURE — 96375 TX/PRO/DX INJ NEW DRUG ADDON: CPT

## 2022-11-21 PROCEDURE — 63600175 PHARM REV CODE 636 W HCPCS: Performed by: EMERGENCY MEDICINE

## 2022-11-21 PROCEDURE — 96361 HYDRATE IV INFUSION ADD-ON: CPT

## 2022-11-21 PROCEDURE — 99284 EMERGENCY DEPT VISIT MOD MDM: CPT | Mod: 25

## 2022-11-21 PROCEDURE — 25000003 PHARM REV CODE 250: Performed by: EMERGENCY MEDICINE

## 2022-11-21 PROCEDURE — 87635 SARS-COV-2 COVID-19 AMP PRB: CPT | Performed by: EMERGENCY MEDICINE

## 2022-11-21 RX ORDER — ONDANSETRON 4 MG/1
4 TABLET, ORALLY DISINTEGRATING ORAL EVERY 8 HOURS PRN
Qty: 30 TABLET | Refills: 0 | Status: SHIPPED | OUTPATIENT
Start: 2022-11-21 | End: 2023-10-02 | Stop reason: ALTCHOICE

## 2022-11-21 RX ORDER — IBUPROFEN 600 MG/1
600 TABLET ORAL EVERY 6 HOURS PRN
Qty: 20 TABLET | Refills: 0 | Status: SHIPPED | OUTPATIENT
Start: 2022-11-21 | End: 2023-10-02 | Stop reason: ALTCHOICE

## 2022-11-21 RX ORDER — ONDANSETRON 2 MG/ML
4 INJECTION INTRAMUSCULAR; INTRAVENOUS
Status: COMPLETED | OUTPATIENT
Start: 2022-11-21 | End: 2022-11-21

## 2022-11-21 RX ORDER — KETOROLAC TROMETHAMINE 30 MG/ML
10 INJECTION, SOLUTION INTRAMUSCULAR; INTRAVENOUS
Status: COMPLETED | OUTPATIENT
Start: 2022-11-21 | End: 2022-11-21

## 2022-11-21 RX ADMIN — ONDANSETRON 4 MG: 2 INJECTION INTRAMUSCULAR; INTRAVENOUS at 07:11

## 2022-11-21 RX ADMIN — SODIUM CHLORIDE 1000 ML: 0.9 INJECTION, SOLUTION INTRAVENOUS at 07:11

## 2022-11-21 RX ADMIN — KETOROLAC TROMETHAMINE 10 MG: 30 INJECTION, SOLUTION INTRAMUSCULAR; INTRAVENOUS at 07:11

## 2022-11-21 NOTE — ED PROVIDER NOTES
SCRIBE #1 NOTE: I, Ian Wilkinson, am scribing for, and in the presence of,  Lainey Farr MD. I have scribed the entire note.       Source of History:  Patient    Chief complaint:  Influenza (Pt to ER with complaint of nausea, vomiting, diarrhea, fever and chills. )      HPI:  Nicolasa Peter is an otherwise healthy 27 y.o. female presenting with persistent vomiting starting last night. Patient recalls beginning to experience nausea, vomiting, and diarrhea last night at work. She has been unable to hold down liquids. This has since been accompanied by nasal congestion, cough, severe body aches, and now generalized weakness. She denies any fever, chills, chest pain, leg pain, or shortness of breath. No SHx of known COVID exposure but does report that she has young children at home.    This is the extent to the patients complaints today here in the emergency department.    ROS: As per HPI and below:  General: No fever.  No chills.  Eyes: No visual changes.  ENT: Congestion. No sore throat. No ear pain.   Head: No headache.    Respiratory: Cough. No shortness of breath.  Cardiovascular: No chest pain.  Abdomen: Nausea, vomiting, and diarrhea.  Genito-Urinary: No abnormal urination.  Neurologic: Generalized weakness. No focal weakness.  No numbness.  MSK: Myalgias. No back pain.  Integument: No rashes or lesions.  Hematologic: No easy bruising.  Endocrine: No excessive thirst or urination.    Review of patient's allergies indicates:  No Known Allergies    PMH:  As per HPI and below:  No past medical history on file.  Past Surgical History:   Procedure Laterality Date    ERCP N/A 11/29/2018    Procedure: ERCP (ENDOSCOPIC RETROGRADE CHOLANGIOPANCREATOGRAPHY);  Surgeon: Gabriel Agudelo MD;  Location: [facility] ENDO;  Service: Endoscopy;  Laterality: N/A;    LAPAROSCOPIC CHOLECYSTECTOMY N/A 11/26/2018    Procedure: CHOLECYSTECTOMY, LAPAROSCOPIC (ADD ON);  Surgeon: Marcio Gonzales Jr., MD;  Location: [facility] OR;  Service:  "General;  Laterality: N/A;  (ADD ON)       Social History     Tobacco Use    Smoking status: Never   Substance Use Topics    Alcohol use: No    Drug use: No       Physical Exam:    BP (!) 108/52 (BP Location: Right arm)   Pulse 97   Temp 98 °F (36.7 °C) (Oral)   Resp 16   Ht 5' 5" (1.651 m)   Wt 90.7 kg (200 lb)   SpO2 95%   BMI 33.28 kg/m²   Nursing note and vital signs reviewed.    Appearance: No acute distress.  Eyes: No conjunctival injection.  Neck: No deformity.   ENT: Oropharynx clear.  No stridor.   Chest/ Respiratory: Clear to auscultation bilaterally.  Good air movement.  No wheezes.  No rhonchi. No rales. No accessory muscle use.  Cardiovascular: Regular rate and rhythm.  No murmurs. No gallops. No rubs.  Abdomen: Soft.  Not distended.  Nontender.  No guarding.  No rebound. Non-peritoneal.  Musculoskeletal: Good range of motion all joints.  No deformities.  Neck supple.  No meningismus.  Skin: No rashes seen.  Good turgor.  No abrasions.  No ecchymoses.  Neurologic: Motor intact.  Sensation intact.  Cerebellar intact.  Cranial nerves intact.  Mental Status:  Alert and oriented x 3.  Appropriate, conversant.      Labs:  Results for orders placed or performed during the hospital encounter of 11/21/22   POCT Influenza A/B Molecular   Result Value Ref Range    POC Molecular Influenza A Ag Negative Negative, Not Reported    POC Molecular Influenza B Ag Negative Negative, Not Reported     Acceptable Yes    POCT COVID-19 Rapid Screening   Result Value Ref Range    POC Rapid COVID Positive (A) Negative     Acceptable Yes          Initial Impression  27 y.o. female with PO intolerance and postivie COIVD test. No evidence of respiratory distress or hypoxia. Patient is healthy and not a candidate for PaxLovid. Plan IVF, toradol, zofran, supportive care.    Differential Dx:  Viral upper respiratory infection, COVID, sinusitis, allergic rhinitis, bronchitis, influenza, pneumonia, " dental infection, pharyngitis     MDM:      Patient improved with treatment in the emergency department and comfortable going home. Discussed reasons to return and importance of followup.  Patient understands that the emergency visit today is primarily to address immediate concerns and to rule out emergent cause of symptoms and that they may require further workup and evaluation as an outpatient. All questions addressed and patient given discharge instructions and followup information.                     Scribe Attestation:   Scribe #1: I performed the above scribed service and the documentation accurately describes the services I performed. I attest to the accuracy of the note.    Physician Attestation for Scribe: I, Lainey Farr MD, reviewed documentation as scribed in my presence, which is both accurate and complete.    Diagnostic Impression:    1. COVID-19    2. Dehydration         ED Disposition Condition    Discharge Stable            ED Prescriptions       Medication Sig Dispense Start Date End Date Auth. Provider    ondansetron (ZOFRAN-ODT) 4 MG TbDL Take 1 tablet (4 mg total) by mouth every 8 (eight) hours as needed (nausea). 30 tablet 11/21/2022 -- Lainey Farr MD    ibuprofen (ADVIL,MOTRIN) 600 MG tablet Take 1 tablet (600 mg total) by mouth every 6 (six) hours as needed for Pain. Take with food 20 tablet 11/21/2022 -- Lainey Farr MD          Follow-up Information       Follow up With Specialties Details Why Contact Info    Edgar Vega MD Obstetrics Schedule an appointment as soon as possible for a visit   [address] [facility] [address]  [phone]                 Lainey Farr MD  12/05/22 112

## 2022-11-21 NOTE — DISCHARGE INSTRUCTIONS
Return to the hospital immediately for shortness of breath, chest pain or if you can't keep fluids down.  Email [email or web address] to report your symptoms and positive test.

## 2023-06-13 ENCOUNTER — PATIENT MESSAGE (OUTPATIENT)
Dept: RESEARCH | Facility: HOSPITAL | Age: 28
End: 2023-06-13
Payer: MEDICAID

## 2023-06-27 ENCOUNTER — PATIENT MESSAGE (OUTPATIENT)
Dept: RESEARCH | Facility: HOSPITAL | Age: 28
End: 2023-06-27
Payer: MEDICAID

## 2023-09-29 PROBLEM — K80.00 CALCULUS OF GALLBLADDER WITH ACUTE CHOLECYSTITIS WITHOUT OBSTRUCTION: Status: RESOLVED | Noted: 2018-11-24 | Resolved: 2023-09-29

## 2023-09-29 PROBLEM — R10.11 INTRACTABLE RIGHT UPPER QUADRANT ABDOMINAL PAIN: Status: RESOLVED | Noted: 2018-11-25 | Resolved: 2023-09-29

## 2023-10-02 ENCOUNTER — OFFICE VISIT (OUTPATIENT)
Dept: INTERNAL MEDICINE | Facility: CLINIC | Age: 28
End: 2023-10-02
Payer: MEDICAID

## 2023-10-02 VITALS
OXYGEN SATURATION: 97 % | BODY MASS INDEX: 31.7 KG/M2 | WEIGHT: 190.25 LBS | HEART RATE: 99 BPM | SYSTOLIC BLOOD PRESSURE: 126 MMHG | DIASTOLIC BLOOD PRESSURE: 80 MMHG | HEIGHT: 65 IN

## 2023-10-02 DIAGNOSIS — Z11.3 ROUTINE SCREENING FOR STI (SEXUALLY TRANSMITTED INFECTION): ICD-10-CM

## 2023-10-02 DIAGNOSIS — F34.1 DYSTHYMIC DISORDER: ICD-10-CM

## 2023-10-02 DIAGNOSIS — G43.009 MIGRAINE WITHOUT AURA AND WITHOUT STATUS MIGRAINOSUS, NOT INTRACTABLE: ICD-10-CM

## 2023-10-02 DIAGNOSIS — R22.9 SKIN NODULE: ICD-10-CM

## 2023-10-02 DIAGNOSIS — Z86.2 HISTORY OF ANEMIA: Primary | ICD-10-CM

## 2023-10-02 DIAGNOSIS — E66.3 OVERWEIGHT: ICD-10-CM

## 2023-10-02 PROCEDURE — 3074F SYST BP LT 130 MM HG: CPT | Mod: CPTII,,, | Performed by: STUDENT IN AN ORGANIZED HEALTH CARE EDUCATION/TRAINING PROGRAM

## 2023-10-02 PROCEDURE — 3074F PR MOST RECENT SYSTOLIC BLOOD PRESSURE < 130 MM HG: ICD-10-PCS | Mod: CPTII,,, | Performed by: STUDENT IN AN ORGANIZED HEALTH CARE EDUCATION/TRAINING PROGRAM

## 2023-10-02 PROCEDURE — 3079F DIAST BP 80-89 MM HG: CPT | Mod: CPTII,,, | Performed by: STUDENT IN AN ORGANIZED HEALTH CARE EDUCATION/TRAINING PROGRAM

## 2023-10-02 PROCEDURE — 3008F BODY MASS INDEX DOCD: CPT | Mod: CPTII,,, | Performed by: STUDENT IN AN ORGANIZED HEALTH CARE EDUCATION/TRAINING PROGRAM

## 2023-10-02 PROCEDURE — 99213 OFFICE O/P EST LOW 20 MIN: CPT | Mod: PBBFAC | Performed by: STUDENT IN AN ORGANIZED HEALTH CARE EDUCATION/TRAINING PROGRAM

## 2023-10-02 PROCEDURE — 3079F PR MOST RECENT DIASTOLIC BLOOD PRESSURE 80-89 MM HG: ICD-10-PCS | Mod: CPTII,,, | Performed by: STUDENT IN AN ORGANIZED HEALTH CARE EDUCATION/TRAINING PROGRAM

## 2023-10-02 PROCEDURE — 99385 PR PREVENTIVE VISIT,NEW,18-39: ICD-10-PCS | Mod: S$PBB,,, | Performed by: STUDENT IN AN ORGANIZED HEALTH CARE EDUCATION/TRAINING PROGRAM

## 2023-10-02 PROCEDURE — 1159F PR MEDICATION LIST DOCUMENTED IN MEDICAL RECORD: ICD-10-PCS | Mod: CPTII,,, | Performed by: STUDENT IN AN ORGANIZED HEALTH CARE EDUCATION/TRAINING PROGRAM

## 2023-10-02 PROCEDURE — 1159F MED LIST DOCD IN RCRD: CPT | Mod: CPTII,,, | Performed by: STUDENT IN AN ORGANIZED HEALTH CARE EDUCATION/TRAINING PROGRAM

## 2023-10-02 PROCEDURE — 99999 PR PBB SHADOW E&M-EST. PATIENT-LVL III: ICD-10-PCS | Mod: PBBFAC,,, | Performed by: STUDENT IN AN ORGANIZED HEALTH CARE EDUCATION/TRAINING PROGRAM

## 2023-10-02 PROCEDURE — 3008F PR BODY MASS INDEX (BMI) DOCUMENTED: ICD-10-PCS | Mod: CPTII,,, | Performed by: STUDENT IN AN ORGANIZED HEALTH CARE EDUCATION/TRAINING PROGRAM

## 2023-10-02 PROCEDURE — 99999 PR PBB SHADOW E&M-EST. PATIENT-LVL III: CPT | Mod: PBBFAC,,, | Performed by: STUDENT IN AN ORGANIZED HEALTH CARE EDUCATION/TRAINING PROGRAM

## 2023-10-02 PROCEDURE — 99385 PREV VISIT NEW AGE 18-39: CPT | Mod: S$PBB,,, | Performed by: STUDENT IN AN ORGANIZED HEALTH CARE EDUCATION/TRAINING PROGRAM

## 2023-10-02 RX ORDER — BUPROPION HYDROCHLORIDE 150 MG/1
TABLET ORAL
Qty: 63 TABLET | Refills: 5 | Status: SHIPPED | OUTPATIENT
Start: 2023-10-02 | End: 2023-10-31 | Stop reason: SDUPTHER

## 2023-10-02 RX ORDER — SUMATRIPTAN SUCCINATE 100 MG/1
TABLET ORAL
Qty: 30 TABLET | Refills: 2 | Status: SHIPPED | OUTPATIENT
Start: 2023-10-02

## 2023-10-02 RX ORDER — SERTRALINE HYDROCHLORIDE 50 MG/1
50 TABLET, FILM COATED ORAL DAILY
Qty: 90 TABLET | Refills: 3 | Status: SHIPPED | OUTPATIENT
Start: 2023-10-02 | End: 2023-10-02 | Stop reason: ALTCHOICE

## 2023-10-02 NOTE — PROGRESS NOTES
[facility]    Subjective:         Patient ID: Nicolasa Peter is a 28 y.o. female.    Chief Complaint: Establish Care    History was obtained from the patient and supplemented through chart review  HPI:  Patient is a 28 y.o. female who presents to [facility].  She normally receives primary care from her OBGYN however would like a general PCP as well.  Patient has a knot on the right side of her neck.  This has been there for about 1 year.  It has varied in size and has shrunk a little over the past few months.  He is not painful or tender.  She is wondering what this is.    Patient reports depression since her significant other was murdered just over 1 year ago.  She has seen a counselor for this who has recommended pharmacotherapy.  Patient states she does not like being at home and works to avoid having to be home with her daughter.    She recently stopped hormonal birth control pills and had various symptoms.  She underwent an extensive hormonal workup with her gyn which included normal TSH, T4, T3, normal hormones including normal FSH, 17 hydroxy progesterone, LH, prolactin, sex hormone binding globulin, free testosterone, total testosterone , as well as negative HIV, negative hepatitis-C.         Medical History  Past Medical History:   Diagnosis Date    Calculus of gallbladder with acute cholecystitis without obstruction 11/24/2018             Surgical hx, family hx, social hx   No family history on file.  Past Surgical History:   Procedure Laterality Date    ERCP N/A 11/29/2018    Procedure: ERCP (ENDOSCOPIC RETROGRADE CHOLANGIOPANCREATOGRAPHY);  Surgeon: Gabriel Agudelo MD;  Location: [facility] ENDO;  Service: Endoscopy;  Laterality: N/A;    LAPAROSCOPIC CHOLECYSTECTOMY N/A 11/26/2018    Procedure: CHOLECYSTECTOMY, LAPAROSCOPIC (ADD ON);  Surgeon: Marcio Gonzales Jr., MD;  Location: [facility] OR;  Service: General;  Laterality: N/A;  (ADD ON)     Social History  "    Socioeconomic History    Marital status: Single   Tobacco Use    Smoking status: Never   Substance and Sexual Activity    Alcohol use: No    Drug use: No    Sexual activity: Yes     Partners: Male       Immunization History   Administered Date(s) Administered    COVID-19, MRNA, LN-S, PF (Pfizer) (Purple Cap) 02/03/2021, 07/15/2021    DTaP 1994, 02/08/1995, 07/27/1999    HIB 1994, 02/08/1995    HPV Quadrivalent 09/29/2010, 10/21/2010, 02/11/2011, 05/12/2011    Hepatitis B, Pediatric/Adolescent 1994, 1994, 02/08/1995, 10/21/2010, 01/06/2011, 05/12/2011    IPV 1994, 02/08/1995, 10/21/2010, 01/06/2011, 02/23/2012    Influenza 01/01/2023    Influenza - Quadrivalent - PF *Preferred* (6 months and older) 12/22/2016, 01/09/2017, 10/10/2018, 12/29/2021, 11/30/2022    MMR 10/21/2010, 01/06/2011    Meningococcal Conjugate (MCV4P) 10/21/2010    Tdap 05/12/2011    Varicella 10/21/2010, 01/06/2011       Current Outpatient Medications   Medication Instructions    buPROPion (WELLBUTRIN XL) 150 MG TB24 tablet Take 1 tablet (150 mg total) by mouth once daily for 3 days, THEN 2 tablets (300 mg total) once daily for 3 days.    PRENATAL VIT/IRON FUM/FOLIC AC (PRENATAL 1+1 ORAL) Oral    sumatriptan (IMITREX) 100 MG tablet Take half to 1 tablet by mouth (50-100mg) once then can repeat in 2 hours. Max 100mg/dose and 200mg/24hrs. Max T 10 days/month         Objective:        Body mass index is 31.66 kg/m².  Vitals:    10/02/23 1534   BP: 126/80   Pulse: 99   SpO2: 97%   Weight: 86.3 kg (190 lb 4.1 oz)   Height: 5' 5" (1.651 m)   PainSc: 0-No pain     Physical Exam  Constitutional:       General: She is not in acute distress.  HENT:      Head: Normocephalic.      Nose: Nose normal.      Mouth/Throat:      Mouth: Mucous membranes are moist.      Pharynx: No oropharyngeal exudate.   Eyes:      Extraocular Movements: Extraocular movements intact.      Pupils: Pupils are equal, round, and reactive to light. "   Neck:      Thyroid: No thyromegaly or thyroid tenderness.   Cardiovascular:      Rate and Rhythm: Normal rate and regular rhythm.      Pulses: Normal pulses.      Heart sounds: No murmur heard.  Pulmonary:      Effort: Pulmonary effort is normal. No respiratory distress.      Breath sounds: No wheezing or rales.   Abdominal:      General: Abdomen is flat. Bowel sounds are normal.      Palpations: Abdomen is soft.      Tenderness: There is no abdominal tenderness.   Musculoskeletal:      Right lower leg: No edema.      Left lower leg: No edema.   Lymphadenopathy:      Cervical: No cervical adenopathy.   Skin:     General: Skin is warm and dry.      Findings: No rash.             Comments: Sub 5 mm intradermal nodule.  Nontender, not fluctuant   Neurological:      General: No focal deficit present.      Mental Status: She is alert.   Psychiatric:         Mood and Affect: Mood normal.         Behavior: Behavior normal.           Lab Results   Component Value Date    WBC 14.60 (H) 07/25/2022    HGB 11.8 (L) 07/25/2022    HCT 34.8 (L) 07/25/2022     07/25/2022    CHOL 158 11/25/2018    TRIG 63 11/25/2018    HDL 43 11/25/2018     (H) 11/30/2018    AST 48 (H) 11/30/2018     07/25/2022    K 3.7 07/25/2022     07/25/2022    CREATININE 0.8 07/25/2022    BUN 5 (L) 07/25/2022    CO2 22 (L) 07/25/2022    TSH 0.576 07/25/2022    HGBA1C 5.2 11/25/2018       The ASCVD Risk score (Jerri DK, et al., 2019) failed to calculate for the following reasons:    The 2019 ASCVD risk score is only valid for ages 40 to 79    Assessment:         1. History of anemia    2. Overweight    3. Routine screening for STI (sexually transmitted infection)    4. Skin nodule    5. Migraine without aura and without status migrainosus, not intractable    6. Persistent depressive disorder          Plan:     1. History of anemia  - CBC W/ AUTO DIFFERENTIAL; Future    2. Overweight  - COMPREHENSIVE METABOLIC PANEL; Future  -  Hemoglobin A1C; Future  - LIPID PANEL; Future    3. Routine screening for STI (sexually transmitted infection)  - RPR; Future    4. Skin nodule  - discussed likely benign etiology.  Suspect this is ingrown hair or cystic acne.  Monitor    5. Migraine without aura and without status migrainosus, not intractable  - sumatriptan (IMITREX) 100 MG tablet; Take half to 1 tablet by mouth (50-100mg) once then can repeat in 2 hours. Max 100mg/dose and 200mg/24hrs. Max T 10 days/month  Dispense: 30 tablet; Refill: 2    6. Dysthymic disorder  - stemming from loss of her significant other 1 year ago  - buPROPion (WELLBUTRIN XL) 150 MG TB24 tablet; Take 1 tablet (150 mg total) by mouth once daily for 3 days, THEN 2 tablets (300 mg total) once daily for 3 days.  Dispense: 63 tablet; Refill: 5    Tests to Keep You Healthy    Cervical Cancer Screening: DUE    Follow up in about 4 weeks (around 10/30/2023). or sooner [place] [facility]  [address] [place] [id number]  Phone [phone]  Fax [phone]    This note is dictated using the M*Modal Fluency Direct word recognition program. It may contain word recognition mistakes or wrong word substitutions that were missed on review.

## 2023-10-03 ENCOUNTER — PATIENT MESSAGE (OUTPATIENT)
Dept: INTERNAL MEDICINE | Facility: CLINIC | Age: 28
End: 2023-10-03
Payer: MEDICAID

## 2023-10-03 ENCOUNTER — LAB VISIT (OUTPATIENT)
Dept: LAB | Facility: OTHER | Age: 28
End: 2023-10-03
Attending: STUDENT IN AN ORGANIZED HEALTH CARE EDUCATION/TRAINING PROGRAM
Payer: MEDICAID

## 2023-10-03 DIAGNOSIS — K14.6 TONGUE BURNING SENSATION: ICD-10-CM

## 2023-10-03 DIAGNOSIS — E66.3 OVERWEIGHT: ICD-10-CM

## 2023-10-03 DIAGNOSIS — Z11.3 ROUTINE SCREENING FOR STI (SEXUALLY TRANSMITTED INFECTION): ICD-10-CM

## 2023-10-03 DIAGNOSIS — Z86.2 HISTORY OF ANEMIA: ICD-10-CM

## 2023-10-03 DIAGNOSIS — R22.0 TONGUE SWELLING: Primary | ICD-10-CM

## 2023-10-03 DIAGNOSIS — K14.0 TONGUE INFLAMMATION: ICD-10-CM

## 2023-10-03 LAB
ALBUMIN SERPL BCP-MCNC: 3.8 G/DL (ref 3.5–5.2)
ALP SERPL-CCNC: 92 U/L (ref 55–135)
ALT SERPL W/O P-5'-P-CCNC: 18 U/L (ref 10–44)
ANION GAP SERPL CALC-SCNC: 10 MMOL/L (ref 8–16)
AST SERPL-CCNC: 16 U/L (ref 10–40)
BASOPHILS # BLD AUTO: 0.05 K/UL (ref 0–0.2)
BASOPHILS NFR BLD: 0.7 % (ref 0–1.9)
BILIRUB SERPL-MCNC: 0.3 MG/DL (ref 0.1–1)
BUN SERPL-MCNC: 10 MG/DL (ref 6–20)
CALCIUM SERPL-MCNC: 8.8 MG/DL (ref 8.7–10.5)
CHLORIDE SERPL-SCNC: 105 MMOL/L (ref 95–110)
CO2 SERPL-SCNC: 24 MMOL/L (ref 23–29)
CREAT SERPL-MCNC: 0.9 MG/DL (ref 0.5–1.4)
DIFFERENTIAL METHOD: NORMAL
EOSINOPHIL # BLD AUTO: 0.2 K/UL (ref 0–0.5)
EOSINOPHIL NFR BLD: 2.7 % (ref 0–8)
ERYTHROCYTE [DISTWIDTH] IN BLOOD BY AUTOMATED COUNT: 14.4 % (ref 11.5–14.5)
EST. GFR  (NO RACE VARIABLE): >60 ML/MIN/1.73 M^2
GLUCOSE SERPL-MCNC: 67 MG/DL (ref 70–110)
HCT VFR BLD AUTO: 38.4 % (ref 37–48.5)
HGB BLD-MCNC: 13.1 G/DL (ref 12–16)
IMM GRANULOCYTES # BLD AUTO: 0.02 K/UL (ref 0–0.04)
IMM GRANULOCYTES NFR BLD AUTO: 0.3 % (ref 0–0.5)
LYMPHOCYTES # BLD AUTO: 2.2 K/UL (ref 1–4.8)
LYMPHOCYTES NFR BLD: 32 % (ref 18–48)
MCH RBC QN AUTO: 28.3 PG (ref 27–31)
MCHC RBC AUTO-ENTMCNC: 34.1 G/DL (ref 32–36)
MCV RBC AUTO: 83 FL (ref 82–98)
MONOCYTES # BLD AUTO: 0.9 K/UL (ref 0.3–1)
MONOCYTES NFR BLD: 13.1 % (ref 4–15)
NEUTROPHILS # BLD AUTO: 3.6 K/UL (ref 1.8–7.7)
NEUTROPHILS NFR BLD: 51.2 % (ref 38–73)
NRBC BLD-RTO: 0 /100 WBC
PLATELET # BLD AUTO: 305 K/UL (ref 150–450)
PMV BLD AUTO: 11.5 FL (ref 9.2–12.9)
POTASSIUM SERPL-SCNC: 3.9 MMOL/L (ref 3.5–5.1)
PROT SERPL-MCNC: 7.2 G/DL (ref 6–8.4)
RBC # BLD AUTO: 4.63 M/UL (ref 4–5.4)
SODIUM SERPL-SCNC: 139 MMOL/L (ref 136–145)
WBC # BLD AUTO: 7.01 K/UL (ref 3.9–12.7)

## 2023-10-03 PROCEDURE — 85025 COMPLETE CBC W/AUTO DIFF WBC: CPT | Performed by: STUDENT IN AN ORGANIZED HEALTH CARE EDUCATION/TRAINING PROGRAM

## 2023-10-03 PROCEDURE — 80061 LIPID PANEL: CPT | Performed by: STUDENT IN AN ORGANIZED HEALTH CARE EDUCATION/TRAINING PROGRAM

## 2023-10-03 PROCEDURE — 86592 SYPHILIS TEST NON-TREP QUAL: CPT | Performed by: STUDENT IN AN ORGANIZED HEALTH CARE EDUCATION/TRAINING PROGRAM

## 2023-10-03 PROCEDURE — 36415 COLL VENOUS BLD VENIPUNCTURE: CPT | Performed by: STUDENT IN AN ORGANIZED HEALTH CARE EDUCATION/TRAINING PROGRAM

## 2023-10-03 PROCEDURE — 80053 COMPREHEN METABOLIC PANEL: CPT | Performed by: STUDENT IN AN ORGANIZED HEALTH CARE EDUCATION/TRAINING PROGRAM

## 2023-10-03 PROCEDURE — 83036 HEMOGLOBIN GLYCOSYLATED A1C: CPT | Performed by: STUDENT IN AN ORGANIZED HEALTH CARE EDUCATION/TRAINING PROGRAM

## 2023-10-03 NOTE — TELEPHONE ENCOUNTER
Patient has contacted office in regards to swollen tongue. Patient states pain started on yesterday and has been persistent. States she woke up this morning in pain to the point that she could barely speak.  MD has been notified and referral will be pended.       Message will be routed to provider for further assistant.

## 2023-10-04 LAB
CHOLEST SERPL-MCNC: 177 MG/DL (ref 120–199)
CHOLEST/HDLC SERPL: 4.5 {RATIO} (ref 2–5)
ESTIMATED AVG GLUCOSE: 108 MG/DL (ref 68–131)
HBA1C MFR BLD: 5.4 % (ref 4–5.6)
HDLC SERPL-MCNC: 39 MG/DL (ref 40–75)
HDLC SERPL: 22 % (ref 20–50)
LDLC SERPL CALC-MCNC: 120.6 MG/DL (ref 63–159)
NONHDLC SERPL-MCNC: 138 MG/DL
RPR SER QL: NORMAL
TRIGL SERPL-MCNC: 87 MG/DL (ref 30–150)

## 2023-10-30 ENCOUNTER — PATIENT MESSAGE (OUTPATIENT)
Dept: INTERNAL MEDICINE | Facility: CLINIC | Age: 28
End: 2023-10-30
Payer: MEDICAID

## 2023-10-30 DIAGNOSIS — F34.1 DYSTHYMIC DISORDER: ICD-10-CM

## 2023-10-31 RX ORDER — BUPROPION HYDROCHLORIDE 450 MG/1
450 TABLET, FILM COATED, EXTENDED RELEASE ORAL DAILY
Qty: 30 TABLET | Refills: 5 | Status: SHIPPED | OUTPATIENT
Start: 2023-10-31 | End: 2023-11-03 | Stop reason: ALTCHOICE

## 2023-11-03 ENCOUNTER — OFFICE VISIT (OUTPATIENT)
Dept: INTERNAL MEDICINE | Facility: CLINIC | Age: 28
End: 2023-11-03
Payer: MEDICAID

## 2023-11-03 VITALS
SYSTOLIC BLOOD PRESSURE: 123 MMHG | HEART RATE: 124 BPM | HEIGHT: 65 IN | BODY MASS INDEX: 31 KG/M2 | WEIGHT: 186.06 LBS | OXYGEN SATURATION: 100 % | DIASTOLIC BLOOD PRESSURE: 75 MMHG

## 2023-11-03 DIAGNOSIS — F32.A ANXIETY AND DEPRESSION: Primary | ICD-10-CM

## 2023-11-03 DIAGNOSIS — F41.9 ANXIETY AND DEPRESSION: Primary | ICD-10-CM

## 2023-11-03 DIAGNOSIS — F34.1 DYSTHYMIC DISORDER: ICD-10-CM

## 2023-11-03 PROCEDURE — 99999 PR PBB SHADOW E&M-EST. PATIENT-LVL III: CPT | Mod: PBBFAC,,, | Performed by: STUDENT IN AN ORGANIZED HEALTH CARE EDUCATION/TRAINING PROGRAM

## 2023-11-03 PROCEDURE — 3044F HG A1C LEVEL LT 7.0%: CPT | Mod: CPTII,,, | Performed by: STUDENT IN AN ORGANIZED HEALTH CARE EDUCATION/TRAINING PROGRAM

## 2023-11-03 PROCEDURE — 99999 PR PBB SHADOW E&M-EST. PATIENT-LVL III: ICD-10-PCS | Mod: PBBFAC,,, | Performed by: STUDENT IN AN ORGANIZED HEALTH CARE EDUCATION/TRAINING PROGRAM

## 2023-11-03 PROCEDURE — 3078F PR MOST RECENT DIASTOLIC BLOOD PRESSURE < 80 MM HG: ICD-10-PCS | Mod: CPTII,,, | Performed by: STUDENT IN AN ORGANIZED HEALTH CARE EDUCATION/TRAINING PROGRAM

## 2023-11-03 PROCEDURE — 3008F BODY MASS INDEX DOCD: CPT | Mod: CPTII,,, | Performed by: STUDENT IN AN ORGANIZED HEALTH CARE EDUCATION/TRAINING PROGRAM

## 2023-11-03 PROCEDURE — 3074F SYST BP LT 130 MM HG: CPT | Mod: CPTII,,, | Performed by: STUDENT IN AN ORGANIZED HEALTH CARE EDUCATION/TRAINING PROGRAM

## 2023-11-03 PROCEDURE — 3074F PR MOST RECENT SYSTOLIC BLOOD PRESSURE < 130 MM HG: ICD-10-PCS | Mod: CPTII,,, | Performed by: STUDENT IN AN ORGANIZED HEALTH CARE EDUCATION/TRAINING PROGRAM

## 2023-11-03 PROCEDURE — 3044F PR MOST RECENT HEMOGLOBIN A1C LEVEL <7.0%: ICD-10-PCS | Mod: CPTII,,, | Performed by: STUDENT IN AN ORGANIZED HEALTH CARE EDUCATION/TRAINING PROGRAM

## 2023-11-03 PROCEDURE — 99213 PR OFFICE/OUTPT VISIT, EST, LEVL III, 20-29 MIN: ICD-10-PCS | Mod: S$PBB,,, | Performed by: STUDENT IN AN ORGANIZED HEALTH CARE EDUCATION/TRAINING PROGRAM

## 2023-11-03 PROCEDURE — 3008F PR BODY MASS INDEX (BMI) DOCUMENTED: ICD-10-PCS | Mod: CPTII,,, | Performed by: STUDENT IN AN ORGANIZED HEALTH CARE EDUCATION/TRAINING PROGRAM

## 2023-11-03 PROCEDURE — 3078F DIAST BP <80 MM HG: CPT | Mod: CPTII,,, | Performed by: STUDENT IN AN ORGANIZED HEALTH CARE EDUCATION/TRAINING PROGRAM

## 2023-11-03 PROCEDURE — 99213 OFFICE O/P EST LOW 20 MIN: CPT | Mod: PBBFAC | Performed by: STUDENT IN AN ORGANIZED HEALTH CARE EDUCATION/TRAINING PROGRAM

## 2023-11-03 PROCEDURE — 99213 OFFICE O/P EST LOW 20 MIN: CPT | Mod: S$PBB,,, | Performed by: STUDENT IN AN ORGANIZED HEALTH CARE EDUCATION/TRAINING PROGRAM

## 2023-11-03 PROCEDURE — 1159F PR MEDICATION LIST DOCUMENTED IN MEDICAL RECORD: ICD-10-PCS | Mod: CPTII,,, | Performed by: STUDENT IN AN ORGANIZED HEALTH CARE EDUCATION/TRAINING PROGRAM

## 2023-11-03 PROCEDURE — 1159F MED LIST DOCD IN RCRD: CPT | Mod: CPTII,,, | Performed by: STUDENT IN AN ORGANIZED HEALTH CARE EDUCATION/TRAINING PROGRAM

## 2023-11-03 RX ORDER — SERTRALINE HYDROCHLORIDE 50 MG/1
50 TABLET, FILM COATED ORAL DAILY
Qty: 30 TABLET | Refills: 11 | Status: SHIPPED | OUTPATIENT
Start: 2023-11-03 | End: 2023-12-08 | Stop reason: ALTCHOICE

## 2023-11-03 NOTE — PROGRESS NOTES
"[facility]    Subjective:     Patient ID: Nicolasa Peter is a 28 y.o. female.  Chief Complaint: Tremors    HPI:  Patient is a 28 y.o. female who   has a past medical history of Calculus of gallbladder with acute cholecystitis without obstruction (11/24/2018).  who presents for follow up of depression.     Today she  noticed sensation of something crawling under skin. Only in lower legs, then noticed it in her upper arms. She also reports her mouth is dry.     She only took a few days of the wellbutrin because she was not given the full supply. She reports continued symptoms of depression, primarily that she "gets in her head."     She also reports constipation. Reports she took one of her mother's linzess and this helped.     Reports she was previously prescribed Xanax but did not like the way this made her feel.  At one point she was given Valium after the death of her grandfather.  Stated she tolerated this better    Current Outpatient Medications   Medication Instructions    PRENATAL VIT/IRON FUM/FOLIC AC (PRENATAL 1+1 ORAL) Oral    sertraline (ZOLOFT) 50 mg, Oral, Daily    sumatriptan (IMITREX) 100 MG tablet Take half to 1 tablet by mouth (50-100mg) once then can repeat in 2 hours. Max 100mg/dose and 200mg/24hrs. Max T 10 days/month       Objective:        Body mass index is 30.96 kg/m².  Vitals:    11/03/23 1355   BP: 123/75   Pulse: (!) 124   SpO2: 100%   Weight: 84.4 kg (186 lb 1.1 oz)   Height: 5' 5" (1.651 m)   PainSc: 0-No pain     Physical Exam  Constitutional:       Appearance: Normal appearance.   Cardiovascular:      Rate and Rhythm: Regular rhythm. Tachycardia present.   Pulmonary:      Effort: Pulmonary effort is normal. No respiratory distress.   Skin:     General: Skin is warm and dry.   Neurological:      General: No focal deficit present.      Mental Status: She is alert.   Psychiatric:         Mood and Affect: Mood normal.         Thought Content: Thought " content normal.           Lab Results   Component Value Date    WBC 7.01 10/03/2023    HGB 13.1 10/03/2023    HCT 38.4 10/03/2023     10/03/2023    CHOL 177 10/03/2023    TRIG 87 10/03/2023    HDL 39 (L) 10/03/2023    ALT 18 10/03/2023    AST 16 10/03/2023     10/03/2023    K 3.9 10/03/2023     10/03/2023    CREATININE 0.9 10/03/2023    BUN 10 10/03/2023    CO2 24 10/03/2023    TSH 0.576 07/25/2022    HGBA1C 5.4 10/03/2023     Assessment:         1. Anxiety and depression    2. Dysthymic disorder          Plan:     2. Anxiety and depression  Did not do full trial of Wellbutrin, reports symptoms are unchanged.  Also presents with mild symptoms of panic attack today, considering paresthesias, dry mouth, tachycardia.  Discussed with patient.  We will try Zoloft for mixed depression, anxiety treatment.  Follow up in 2-3 weeks.  This may also help with constipation.  - that current symptoms are consistent with mild panic attack.  Advised breathing exercises as first-line.  Discussed potential use of benzodiazepine p.r.n. panic attack however she declined at this time due to a negative experience with Xanax in the past.    - sertraline (ZOLOFT) 50 MG tablet; Take 1 tablet (50 mg total) by mouth once daily.  Dispense: 30 tablet; Refill: 11    Tests to Keep You Healthy    Cervical Cancer Screening: DUE    Follow up in about 2 weeks (around 11/17/2023). or sooner prn (as needed)        Zhao Brizuela  [facility]  [address]  [address]  [place] [address]  Phone [phone]  Fax [phone]      This note is dictated using the M*Modal Fluency Direct word recognition program. It may contain word recognition mistakes or wrong word substitutions that were missed on review.

## 2023-11-17 ENCOUNTER — OFFICE VISIT (OUTPATIENT)
Dept: URGENT CARE | Facility: CLINIC | Age: 28
End: 2023-11-17
Payer: MEDICAID

## 2023-11-17 VITALS
BODY MASS INDEX: 31.19 KG/M2 | WEIGHT: 187.19 LBS | TEMPERATURE: 99 F | SYSTOLIC BLOOD PRESSURE: 144 MMHG | RESPIRATION RATE: 18 BRPM | DIASTOLIC BLOOD PRESSURE: 89 MMHG | OXYGEN SATURATION: 98 % | HEIGHT: 65 IN | HEART RATE: 118 BPM

## 2023-11-17 DIAGNOSIS — B86 SCABIES: ICD-10-CM

## 2023-11-17 DIAGNOSIS — J06.9 ACUTE URI: Primary | ICD-10-CM

## 2023-11-17 DIAGNOSIS — R30.0 DYSURIA: ICD-10-CM

## 2023-11-17 LAB
B-HCG UR QL: NEGATIVE
BILIRUB UR QL STRIP: NEGATIVE
CTP QC/QA: YES
GLUCOSE UR QL STRIP: NEGATIVE
KETONES UR QL STRIP: NEGATIVE
LEUKOCYTE ESTERASE UR QL STRIP: NEGATIVE
PH, POC UA: 6.5 (ref 5–8)
POC BLOOD, URINE: NEGATIVE
POC NITRATES, URINE: NEGATIVE
PROT UR QL STRIP: NEGATIVE
SP GR UR STRIP: 1.02 (ref 1–1.03)
UROBILINOGEN UR STRIP-ACNC: NORMAL (ref 0.1–1.1)

## 2023-11-17 PROCEDURE — 81003 POCT URINALYSIS, DIPSTICK, AUTOMATED, W/O SCOPE: ICD-10-PCS | Mod: QW,S$GLB,, | Performed by: FAMILY MEDICINE

## 2023-11-17 PROCEDURE — 81025 URINE PREGNANCY TEST: CPT | Mod: S$GLB,,, | Performed by: FAMILY MEDICINE

## 2023-11-17 PROCEDURE — 99214 OFFICE O/P EST MOD 30 MIN: CPT | Mod: S$GLB,,, | Performed by: FAMILY MEDICINE

## 2023-11-17 PROCEDURE — 99214 PR OFFICE/OUTPT VISIT, EST, LEVL IV, 30-39 MIN: ICD-10-PCS | Mod: S$GLB,,, | Performed by: FAMILY MEDICINE

## 2023-11-17 PROCEDURE — 81025 POCT URINE PREGNANCY: ICD-10-PCS | Mod: S$GLB,,, | Performed by: FAMILY MEDICINE

## 2023-11-17 PROCEDURE — 81003 URINALYSIS AUTO W/O SCOPE: CPT | Mod: QW,S$GLB,, | Performed by: FAMILY MEDICINE

## 2023-11-17 RX ORDER — PERMETHRIN 50 MG/G
CREAM TOPICAL ONCE
Qty: 60 G | Refills: 0 | Status: SHIPPED | OUTPATIENT
Start: 2023-11-17 | End: 2023-11-17

## 2023-11-18 NOTE — PROGRESS NOTES
"Subjective:      Patient ID: Nicolasa Peter is a 28 y.o. female.    Vitals:  height is 5' 5" (1.651 m) and weight is 84.9 kg (187 lb 2.7 oz). Her tympanic temperature is 99.2 °F (37.3 °C). Her blood pressure is 144/89 (abnormal) and her pulse is 118 (abnormal). Her respiration is 18 and oxygen saturation is 98%.     Chief Complaint: Rash (Appointment with daughter Chiki Britton 12/01/2017 - Entered by patient)    Patient and or 5 years old daughter are here with rash and concerned about possible scabies.  Reports itching and scratching.  Also reports mild dysuria for last few days.  Patient also reports mild sinus congestion for last few days.  States the home COVID test is negative.  Denies fever, chills, abd pain, back pain, N/V, blood on urine, vaginal discharge, weakness/dizziness, chest pain, SOB.        Rash  This is a new problem. The current episode started in the past 7 days. The problem is unchanged.       Skin:  Positive for rash.      Objective:     Physical Exam   Constitutional: She is oriented to person, place, and time. She appears well-developed. She is cooperative.   HENT:   Head: Normocephalic and atraumatic.   Ears:   Right Ear: Hearing, tympanic membrane, external ear and ear canal normal. impacted cerumen  Left Ear: Hearing, tympanic membrane, external ear and ear canal normal. impacted cerumen  Nose: Congestion present. No mucosal edema, rhinorrhea or nasal deformity. No epistaxis. Right sinus exhibits no maxillary sinus tenderness and no frontal sinus tenderness. Left sinus exhibits no maxillary sinus tenderness and no frontal sinus tenderness.   Mouth/Throat: Uvula is midline, oropharynx is clear and moist and mucous membranes are normal. No trismus in the jaw. Normal dentition. No uvula swelling. No oropharyngeal exudate or posterior oropharyngeal erythema.   Eyes: Conjunctivae and lids are normal.   Neck: Trachea normal and phonation normal. Neck supple.   Cardiovascular: Normal " rate, regular rhythm, normal heart sounds and normal pulses.   No murmur heard.  Pulmonary/Chest: Effort normal and breath sounds normal. No stridor. No respiratory distress. She has no wheezes. She has no rhonchi. She has no rales.   Abdominal: Normal appearance and bowel sounds are normal. She exhibits no distension. Soft. There is no abdominal tenderness. There is no left CVA tenderness and no right CVA tenderness.   Musculoskeletal: Normal range of motion.         General: Normal range of motion.      Cervical back: She exhibits no tenderness.   Lymphadenopathy:     She has no cervical adenopathy.   Neurological: She is alert and oriented to person, place, and time. She exhibits normal muscle tone.   Skin: Skin is warm, dry, intact and rash.         Comments:   Positive scattered maculopapular rash over extremities   No hives or vesicles.    No face, tongue, throat swelling.   Psychiatric: Her speech is normal and behavior is normal. Judgment and thought content normal.   Nursing note and vitals reviewed.      Assessment:     1. Acute URI    2. Dysuria    3. Scabies        Plan:   Discussed exam findings/results/diagnosis/plan with patient in clinic. Advised to f/u with PCP within 2-5 days. ER precautions given if symptoms get any worse. All questions answered. Patient verbally understood and agreed with treatment plan.     Acute URI    Dysuria  -     POCT Urinalysis, Dipstick, Automated, W/O Scope  -     POCT urine pregnancy    Scabies    Other orders  -     permethrin (ELIMITE) 5 % cream; Apply topically once. Apply had 2 to at night and take a shower next morning after 10 hours.  Repeat treatments after 14 days if symptoms persist. for 1 dose  Dispense: 60 g; Refill: 0

## 2023-11-26 ENCOUNTER — HOSPITAL ENCOUNTER (EMERGENCY)
Facility: HOSPITAL | Age: 28
Discharge: HOME | End: 2023-11-26
Attending: EMERGENCY MEDICINE
Payer: MEDICAID

## 2023-11-26 VITALS
RESPIRATION RATE: 20 BRPM | DIASTOLIC BLOOD PRESSURE: 82 MMHG | WEIGHT: 191.56 LBS | BODY MASS INDEX: 32.7 KG/M2 | HEART RATE: 109 BPM | SYSTOLIC BLOOD PRESSURE: 137 MMHG | OXYGEN SATURATION: 100 % | TEMPERATURE: 98 F | HEIGHT: 64 IN

## 2023-11-26 DIAGNOSIS — R21 RASH: Primary | ICD-10-CM

## 2023-11-26 DIAGNOSIS — F41.9 ANXIETY: ICD-10-CM

## 2023-11-26 PROCEDURE — 99281 EMR DPT VST MAYX REQ PHY/QHP: CPT

## 2023-11-26 RX ORDER — PERMETHRIN 50 MG/G
CREAM TOPICAL
Qty: 60 G | Refills: 0 | Status: SHIPPED | OUTPATIENT
Start: 2023-11-26 | End: 2023-11-26 | Stop reason: SDUPTHER

## 2023-11-27 NOTE — DISCHARGE INSTRUCTIONS
Return to this ED if wounds become more painful and tender, if you develop fever, if no improvement with current plan, if any other problems occur.  Contact your primary care provider for re-evaluation of worsening anxiety. Follow-up with dermatologist for re-evaluation.  Return to this ED if you begin to feel suicidal or homicidal, if you began with severe depression or feel as if you are need any help.  Contact 911 for any emergencies.    Thank you for coming to our Emergency Department today. It is important to remember that some problems or medical conditions are difficult to diagnose and may not be found or addressed during your Emergency Department visit.     Be sure to follow up with your primary care doctor and review all labs/imaging/tests that were performed during your ER visit with them. Some labs/imaging/tests may be outside of the normal range, and require non-emergent follow-up and/or further investigation/treatment/procedures/testing to help diagnose/exclude/prevent complications or other potentially serious medical conditions that were not discussed or addressed during your ER visit.    If you do not have a primary care doctor, you may contact the one listed on your discharge paperwork or you may also call the [facility] Appointment Desk at [phone] to schedule an appointment and establish care with one. It is important to your health that you have a primary care doctor.    Please take all medications as directed. All medications may potentially have side-effects and it is impossible to predict which medications may give you side-effects or what side-effects (if any) they will give you.. If you feel that you are having a negative effect or side-effect of any medication you should immediately stop taking them and seek medical attention. If you feel that you are having a life-threatening reaction call 911.    Return to the ER with any questions/concerns, new/concerning symptoms, worsening or  failure to improve.     Do not drive, swim, climb to height, take a bath, operate heavy machinery, drink alcohol or take potentially sedating medications, sign any legal documents or make any important decisions for 24 hours if you have received any pain medications, sedatives or mood altering drugs during your ER visit or within 24 hours of taking them if they have been prescribed to you.     You can find additional resources for Dentists, hearing aids, durable medical equipment, low cost pharmacies and other resources at [email or web address]

## 2023-11-27 NOTE — ED NOTES
Pt presents w/ c/o small scabs scattered to entire body + pruritus. Daughter has similar symptoms.  Pt states worked w/ a pt that had scabies.  Pt is anxious, restless, AAOx3, resp even and unlabored, skin warm and dry.  Pt placed in hospital gown.

## 2023-11-27 NOTE — ED NOTES
"Went to give pt her d/c instructions, pt found in room with pointed scissors in her hand.  Pt continues to complain about the doctors diagnosis and stated "I don't want anything from him" regarding the d/c instructions. RN did not feel safe with the pt holding the pointed scissors, hospital security notified.    "

## 2023-11-27 NOTE — ED NOTES
"Patient given urine cup and notified of need of sample. Patient refusing to wait in ED lobby and states she wants to go walk around outside. Patient educated that it is recommended for patients to wait in the lobby prior to being roomed. Patient continues to walk outside and states that she doesn't want to "give this to anyone else!". Will inform other ED staff that patient is waiting outside per her request.  "

## 2023-11-27 NOTE — ED PROVIDER NOTES
"Encounter Date: 11/26/2023       History     Chief Complaint   Patient presents with    Rash     Pt states "I think I got scabies" and c/o small red itchy dots to full body; pt reports she had a patient with scabies; pt itchy all over, appears anxious, and states "this is making my anxiety bad!"     29yo F presents to ED with chief complaint generalized pruritus, suspected scabies.    Patient states she was in contact with someone with scabies proximally 1 month ago.  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  She then states 2 weeks ago she was in close contact with another patient who reportedly had scabies as well.  She began with pruritic lesions to her face, began to spread to her chest, arms, lower extremities. Patient went to a local urgent care on 11/17 with complaint of pruritic rash, suspect scabies, dysuria.  She was given Permethrin; states she took two separate treatments without relief of pruritus or rash.  She states she has taken Benadryl without any relief of the pruritus.  She states there is generalized pruritus, not only to the lesions.  No large, open, draining wounds.  No fever chills myalgias. Denies any recent illness.    Patient feels as if "she is going crazy".  No SI or HI.  States feels as if there is something crawling under her skin.    She denies history of liver disease.  No reported IV drug use or illicit substance use. Pt requesting "full workup", labs, IVF.  "I just want to know what is wrong with me. "      She denies history of acne.  She does state that she sees a local dermatologist, .     Past medical history:   Anxiety   Depression  Anemia   Migraines    Daily Rx: Wellbutrin      Review of patient's allergies indicates:  No Known Allergies  Past Medical History:   Diagnosis Date    Calculus of gallbladder with acute cholecystitis without obstruction 11/24/2018     Past Surgical History:   Procedure Laterality Date    ERCP N/A 11/29/2018    Procedure: ERCP (ENDOSCOPIC " RETROGRADE CHOLANGIOPANCREATOGRAPHY);  Surgeon: Gabriel Agudelo MD;  Location: [facility] ENDO;  Service: Endoscopy;  Laterality: N/A;    LAPAROSCOPIC CHOLECYSTECTOMY N/A 11/26/2018    Procedure: CHOLECYSTECTOMY, LAPAROSCOPIC (ADD ON);  Surgeon: Marcio Gonzales Jr., MD;  Location: [facility] OR;  Service: General;  Laterality: N/A;  (ADD ON)     No family history on file.  Social History     Tobacco Use    Smoking status: Never    Smokeless tobacco: Never   Substance Use Topics    Alcohol use: No    Drug use: No     Review of Systems   Constitutional:  Negative for chills and fever.   HENT:  Negative for facial swelling.    Respiratory:  Negative for shortness of breath.    Cardiovascular:  Negative for chest pain.   Skin:  Positive for rash.   Neurological:  Negative for syncope.   Psychiatric/Behavioral:  Positive for agitation. The patient is nervous/anxious.        Physical Exam     Initial Vitals [11/26/23 2008]   BP Pulse Resp Temp SpO2   137/82 109 20 98.4 °F (36.9 °C) 100 %      MAP       --         Physical Exam    Nursing note and vitals reviewed.  Constitutional: She appears well-developed and well-nourished. She is not diaphoretic. No distress.   Nontoxic, overall well-appearing.   HENT:   Head: Normocephalic and atraumatic.   Neck: Neck supple.   Normal range of motion.  Pulmonary/Chest: No respiratory distress.   Musculoskeletal:      Cervical back: Normal range of motion and neck supple.     Neurological: She is alert and oriented to person, place, and time. GCS score is 15. GCS eye subscore is 4. GCS verbal subscore is 5. GCS motor subscore is 6.   Skin:   Various small, excoriated lesions to bilateral temples, cheeks bilaterally, various stages of healing.  Small, scant, punctate crusted wounds to bilateral thighs.  No intertriginous lesions.  No obvious linear track-like lesions.    Psychiatric:   Anxious, restless         ED Course   Procedures  Labs Reviewed - No data to display       Imaging Results     None          Medications - No data to display  Medical Decision Making  28 year-old female with reported history of 2 weeks of generalized pruritus, pruritic rash.    Differential diagnosis:  Scabies, cellulitis, folliculitis, drug allergy, delusions of parasitosis, psychosis, panic attack    Amount and/or Complexity of Data Reviewed  External Data Reviewed: labs and notes.  Labs:  Decision-making details documented in ED Course.     Details: Normal labs on 10/03/2023  Discussion of management or test interpretation with external provider(s): No history of liver disease.  No jaundice or scleral icterus.  Reassuring labs on 10/03/2023 visit.    Offered prn benzo by PCP at recent 11/3 visit due to suspected panic attacks, but pt declined. Compliant with Wellbutrin.  Was complaining of sensation of something crawling under her skin on 11/03 PCP visit.    Frequently pointing to various areas of her arms and legs stating that she is a new outbreak or that there is burrowing.  I do not visualize any acute lesions.  There is some small, punctate crusted lesions to lower extremities, excoriated lesions to her face.  Patient is constantly rubbing her scalp, she is restless.  She is anxious.  She does not appear to be intoxicated.  She is not diaphoretic.  She is alert and oriented.  No SI or HI.  Mild tachycardia, otherwise normal vitals.  Has already had 2 treatments with permethrin, rash not really consistent with scabies, also given that there was no relief with permethrin I think also makes scabies less likely.  She is also complaining of skin crawling sensation prior to onset of symptoms when she went to her PCP office, so think may in fact be related to anxiety or panic attack, possibly delusions of parasitosis rather than integumentary issue.  No evidence of systemic illness.  No immunosuppression or significant comorbidities to suggest serious infection. She has tried antihistamines without any relief.  No  evidence of airway involvement.  No documented history of anaphylaxis.  I have low suspicion for serious medical illness at this time.  I have advised her to follow-up with dermatologist, stated that we can try some other topical ointment to see if any improvement in the local pruritus although there is really generalized pruritus.     At this time, I think presentation may be related to anxiety or panic attack. After discussion, given no labs since onset of symptoms, I have offered CMP to see if there is any liver abnormality however patient states that she is tired of talking to me, she does not like my opinion.  She is requesting to see the house supervisor.  She was seen by charge nurse.  I have offered benzo, however she will need to get a ride.  Patient does not want any such medications. I have no reason to suspect the patient does not have capability to understand the consequences of her actions. She does not appear intoxicated, deranged, or altered.  I have low suspicion that patient is gravely disabled or a harm to herself or others.  I have discharged her with instructions to contact her dermatologist, to contact her PCP.    Risk  Prescription drug management.                                   Clinical Impression:  Final diagnoses:  [R21] Rash (Primary)  [F41.9] Anxiety          ED Disposition Condition    Discharge Stable          ED Prescriptions       Medication Sig Dispense Start Date End Date Auth. Provider    permethrin (ELIMITE) 5 % cream  (Status: Discontinued) Apply to all areas of the body from the neck to soles of feet (30 g for average adult); leave on for 8 to 14 hours before removing by washing (shower or bath).  You can repeat the treatment in a few days if you continue with itchy lesions. 60 g 11/26/2023 11/26/2023 Fernie Manzano, KRISTOPHER          Follow-up Information       Follow up With Specialties Details Why Contact Info    Dermatology, [facility], Dermatology  Schedule an appointment as soon as possible for a visit  For reevaluation, If symptoms persist [address]  [address]  [facility] [address]  [phone]               Fernie Manzano, PA-C  11/27/23 0133

## 2023-11-27 NOTE — ED NOTES
" Denies HI, SI, or A/V hallucinations.  Pt is anxious and complaining about "no one sees something is wrong with my body?"  Comfort given as needed,  pt not receptive to care. Pt instructed to call someone who can bring her home because the MD would like to give her a Benzodiazepine for her anxiety.  Pt continued to argue and said "I want an IV or something, Im not being discharged".  Pt asked to speak to charge nurse.    "

## 2023-12-06 ENCOUNTER — PATIENT MESSAGE (OUTPATIENT)
Dept: INTERNAL MEDICINE | Facility: CLINIC | Age: 28
End: 2023-12-06
Payer: MEDICAID

## 2023-12-08 ENCOUNTER — OFFICE VISIT (OUTPATIENT)
Dept: INTERNAL MEDICINE | Facility: CLINIC | Age: 28
End: 2023-12-08
Payer: MEDICAID

## 2023-12-08 DIAGNOSIS — F41.9 ANXIETY AND DEPRESSION: Primary | ICD-10-CM

## 2023-12-08 DIAGNOSIS — F32.A ANXIETY AND DEPRESSION: Primary | ICD-10-CM

## 2023-12-08 DIAGNOSIS — F41.0 PANIC DISORDER: ICD-10-CM

## 2023-12-08 PROCEDURE — 3044F HG A1C LEVEL LT 7.0%: CPT | Mod: CPTII,95,, | Performed by: STUDENT IN AN ORGANIZED HEALTH CARE EDUCATION/TRAINING PROGRAM

## 2023-12-08 PROCEDURE — 1159F MED LIST DOCD IN RCRD: CPT | Mod: CPTII,95,, | Performed by: STUDENT IN AN ORGANIZED HEALTH CARE EDUCATION/TRAINING PROGRAM

## 2023-12-08 PROCEDURE — 1159F PR MEDICATION LIST DOCUMENTED IN MEDICAL RECORD: ICD-10-PCS | Mod: CPTII,95,, | Performed by: STUDENT IN AN ORGANIZED HEALTH CARE EDUCATION/TRAINING PROGRAM

## 2023-12-08 PROCEDURE — 3044F PR MOST RECENT HEMOGLOBIN A1C LEVEL <7.0%: ICD-10-PCS | Mod: CPTII,95,, | Performed by: STUDENT IN AN ORGANIZED HEALTH CARE EDUCATION/TRAINING PROGRAM

## 2023-12-08 PROCEDURE — 99213 OFFICE O/P EST LOW 20 MIN: CPT | Mod: 95,,, | Performed by: STUDENT IN AN ORGANIZED HEALTH CARE EDUCATION/TRAINING PROGRAM

## 2023-12-08 PROCEDURE — 99213 PR OFFICE/OUTPT VISIT, EST, LEVL III, 20-29 MIN: ICD-10-PCS | Mod: 95,,, | Performed by: STUDENT IN AN ORGANIZED HEALTH CARE EDUCATION/TRAINING PROGRAM

## 2023-12-08 RX ORDER — BUSPIRONE HYDROCHLORIDE 5 MG/1
5 TABLET ORAL 2 TIMES DAILY
Qty: 60 TABLET | Refills: 11 | Status: SHIPPED | OUTPATIENT
Start: 2023-12-08 | End: 2023-12-21 | Stop reason: SDUPTHER

## 2023-12-08 NOTE — PROGRESS NOTES
The patient location is: Patient's home  The chief complaint leading to consultation is Anxiety    Visit type: audio only due to patient preferance    Face to Face time with patient: 9 minutes  18 minutes of total time spent on the encounter, which includes face to face time and non-face to face time preparing to see the patient (eg, review of tests), Obtaining and/or reviewing separately obtained history, Documenting clinical information in the electronic or other health record, Independently interpreting results (not separately reported) and communicating results to the patient/family/caregiver, or Care coordination (not separately reported).         Each patient to whom he or she provides medical services by telemedicine is:  (1) informed of the relationship between the physician and patient and the respective role of any other health care provider with respect to management of the patient; and (2) notified that he or she may decline to receive medical services by telemedicine and may withdraw from such care at any time.    Notes:   [facility]    Subjective:     Patient ID: Nicolasa Amaral Peter is a 28 y.o. female.  Chief Complaint: Anxiety    HPI:  Patient is a 28 y.o. female who   has a past medical history of Calculus of gallbladder with acute cholecystitis without obstruction (11/24/2018).  who presents for anxiety.  States she took Zoloft for approximately 3 weeks.  She feels this made her anxiety attacks worse and more frequent.  She states they would come on without warning.  The consists of paresthesias and palpitations.  She was seen in the ER on 11/26 for this.  Primary problem documented for that visit is scabies however this was primarily because she was complaining of his sensation of something crawling under her skin while she was having an anxiety attack.  She states she has no energy to get up and do anything recently.  She denies suicidal ideation at this  time.    Objective:        There is no height or weight on file to calculate BMI.  Vitals:    12/08/23 0755   PainSc: 0-No pain     Physical Exam  Psychiatric:         Mood and Affect: Mood is depressed.           Assessment:         1. Anxiety and depression    2. Panic disorder          Plan:   Patient did not have good response to Zoloft.  Stasis increased her panic attacks.  Reports she took this daily for 3 weeks.  We will proceed with BuSpar 5 mg b.i.d..  Advised patient that she can increase to 10 mg after 3-4 days if effect is suboptimal.  We will also place referral to Psychiatry has now this is the 3rd attempted treatment with a psychiatric agent patient has not showed significant improvement.  Follow up in 2 weeks.    Anxiety and depression  -     Ambulatory referral/consult to Psychiatry; Future; Expected date: 12/15/2023  -     busPIRone (BUSPAR) 5 MG Tab; Take 1 tablet (5 mg total) by mouth 2 (two) times daily.  Dispense: 60 tablet; Refill: 11    Panic disorder  -     Ambulatory referral/consult to Psychiatry; Future; Expected date: 12/15/2023  -     busPIRone (BUSPAR) 5 MG Tab; Take 1 tablet (5 mg total) by mouth 2 (two) times daily.  Dispense: 60 tablet; Refill: 11        Follow up in about 2 weeks (around 12/22/2023). or sooner prn (as needed)        Zhao Brizuela  [facility]  [address] [address]  Phone [phone]  Fax [phone]      This note is dictated using the M*Modal Fluency Direct word recognition program. It may contain word recognition mistakes or wrong word substitutions that were missed on review.    Answers submitted by the patient for this visit:  Review of Systems Questionnaire (Submitted on 12/8/2023)  activity change: Yes  unexpected weight change: No  neck pain: No  hearing loss: No  rhinorrhea: No  trouble swallowing: No  eye discharge: No  visual disturbance: No  chest tightness: No  wheezing: No  chest pain:  No  palpitations: No  blood in stool: No  constipation: No  vomiting: No  diarrhea: No  polydipsia: No  polyuria: No  difficulty urinating: No  hematuria: No  menstrual problem: No  dysuria: No  joint swelling: No  arthralgias: No  headaches: No  weakness: No  confusion: No  dysphoric mood: Yes

## 2023-12-21 DIAGNOSIS — F32.A ANXIETY AND DEPRESSION: ICD-10-CM

## 2023-12-21 DIAGNOSIS — F41.0 PANIC DISORDER: ICD-10-CM

## 2023-12-21 DIAGNOSIS — F41.9 ANXIETY AND DEPRESSION: ICD-10-CM

## 2023-12-21 RX ORDER — BUSPIRONE HYDROCHLORIDE 5 MG/1
5 TABLET ORAL 3 TIMES DAILY
Qty: 90 TABLET | Refills: 11 | Status: SHIPPED | OUTPATIENT
Start: 2023-12-21 | End: 2024-12-20

## 2024-01-05 ENCOUNTER — TELEPHONE (OUTPATIENT)
Dept: ADMINISTRATIVE | Facility: HOSPITAL | Age: 29
End: 2024-01-05
Payer: MEDICAID

## 2024-02-01 ENCOUNTER — PATIENT MESSAGE (OUTPATIENT)
Dept: ADMINISTRATIVE | Facility: OTHER | Age: 29
End: 2024-02-01
Payer: MEDICAID

## 2024-04-02 ENCOUNTER — PATIENT OUTREACH (OUTPATIENT)
Dept: ADMINISTRATIVE | Facility: HOSPITAL | Age: 29
End: 2024-04-02
Payer: MEDICAID

## 2024-04-02 DIAGNOSIS — Z12.4 ENCOUNTER FOR SCREENING FOR CERVICAL CANCER: Primary | ICD-10-CM

## 2024-04-02 NOTE — PROGRESS NOTES
Health Maintenance Due   Topic Date Due    Hepatitis C Screening  Never done    HIV Screening  Never done    Pap Smear  Never done    TETANUS VACCINE  05/12/2021    COVID-19 Vaccine (3 - 2023-24 season) 09/01/2023   Cervical screening due, order placed patient cancelled 3/19/24 OBGYN appt. Portal message sent for rescheduling.  Health Maintenance reviewed, updated and Links triggered.

## 2024-04-11 ENCOUNTER — PATIENT OUTREACH (OUTPATIENT)
Dept: ADMINISTRATIVE | Facility: HOSPITAL | Age: 29
End: 2024-04-11
Payer: MEDICAID

## 2024-04-11 NOTE — PROGRESS NOTES
Health Maintenance Due   Topic Date Due    Hepatitis C Screening  Never done    HIV Screening  Never done    Pap Smear  Never done    TETANUS VACCINE  05/12/2021    COVID-19 Vaccine (3 - 2023-24 season) 09/01/2023   Cervical screening due, order placed patient cancelled 3/19/24 OBGYN appt. Portal message sent for rescheduling.  Health Maintenance reviewed, updated and Links triggered. ([place]) 4/11/24

## 2025-06-25 ENCOUNTER — PATIENT MESSAGE (OUTPATIENT)
Dept: INTERNAL MEDICINE | Facility: CLINIC | Age: 30
End: 2025-06-25
Payer: MEDICAID

## 2025-06-25 DIAGNOSIS — Z11.4 SCREENING FOR HIV (HUMAN IMMUNODEFICIENCY VIRUS): ICD-10-CM

## 2025-06-25 DIAGNOSIS — Z00.00 ANNUAL PHYSICAL EXAM: Primary | ICD-10-CM

## 2025-06-25 DIAGNOSIS — Z11.59 NEED FOR HEPATITIS C SCREENING TEST: ICD-10-CM

## 2025-06-25 NOTE — TELEPHONE ENCOUNTER
Patient is requesting orders for Annual labs to complete prior to upcoming appointment with Dr. Brizuela on 7/2/2025.     Orders pended for review if appropriate. Thank you!

## 2025-07-02 ENCOUNTER — RESULTS FOLLOW-UP (OUTPATIENT)
Dept: INTERNAL MEDICINE | Facility: CLINIC | Age: 30
End: 2025-07-02

## 2025-07-02 ENCOUNTER — LAB VISIT (OUTPATIENT)
Dept: LAB | Facility: OTHER | Age: 30
End: 2025-07-02
Attending: STUDENT IN AN ORGANIZED HEALTH CARE EDUCATION/TRAINING PROGRAM

## 2025-07-02 ENCOUNTER — TELEPHONE (OUTPATIENT)
Dept: INTERNAL MEDICINE | Facility: CLINIC | Age: 30
End: 2025-07-02

## 2025-07-02 ENCOUNTER — OFFICE VISIT (OUTPATIENT)
Dept: INTERNAL MEDICINE | Facility: CLINIC | Age: 30
End: 2025-07-02

## 2025-07-02 VITALS
WEIGHT: 201.5 LBS | OXYGEN SATURATION: 100 % | HEART RATE: 104 BPM | DIASTOLIC BLOOD PRESSURE: 79 MMHG | BODY MASS INDEX: 34.59 KG/M2 | SYSTOLIC BLOOD PRESSURE: 131 MMHG

## 2025-07-02 DIAGNOSIS — Z11.4 SCREENING FOR HIV (HUMAN IMMUNODEFICIENCY VIRUS): ICD-10-CM

## 2025-07-02 DIAGNOSIS — F41.1 GENERALIZED ANXIETY DISORDER: ICD-10-CM

## 2025-07-02 DIAGNOSIS — Z11.59 NEED FOR HEPATITIS C SCREENING TEST: ICD-10-CM

## 2025-07-02 DIAGNOSIS — Z00.00 ANNUAL PHYSICAL EXAM: ICD-10-CM

## 2025-07-02 DIAGNOSIS — F50.819 BINGE EATING DISORDER, UNSPECIFIED SEVERITY: ICD-10-CM

## 2025-07-02 DIAGNOSIS — Z00.00 ANNUAL PHYSICAL EXAM: Primary | ICD-10-CM

## 2025-07-02 LAB
ABSOLUTE EOSINOPHIL (OHS): 0.03 K/UL
ABSOLUTE MONOCYTE (OHS): 0.5 K/UL (ref 0.3–1)
ABSOLUTE NEUTROPHIL COUNT (OHS): 3.64 K/UL (ref 1.8–7.7)
ALBUMIN SERPL BCP-MCNC: 3.9 G/DL (ref 3.5–5.2)
ALP SERPL-CCNC: 85 UNIT/L (ref 40–150)
ALT SERPL W/O P-5'-P-CCNC: 23 UNIT/L (ref 10–44)
ANION GAP (OHS): 11 MMOL/L (ref 8–16)
AST SERPL-CCNC: 17 UNIT/L (ref 11–45)
BASOPHILS # BLD AUTO: 0.05 K/UL
BASOPHILS NFR BLD AUTO: 0.8 %
BILIRUB SERPL-MCNC: 0.2 MG/DL (ref 0.1–1)
BUN SERPL-MCNC: 5 MG/DL (ref 6–20)
CALCIUM SERPL-MCNC: 9.6 MG/DL (ref 8.7–10.5)
CHLORIDE SERPL-SCNC: 105 MMOL/L (ref 95–110)
CHOLEST SERPL-MCNC: 150 MG/DL (ref 120–199)
CHOLEST/HDLC SERPL: 4.2 {RATIO} (ref 2–5)
CO2 SERPL-SCNC: 27 MMOL/L (ref 23–29)
CREAT SERPL-MCNC: 0.8 MG/DL (ref 0.5–1.4)
ERYTHROCYTE [DISTWIDTH] IN BLOOD BY AUTOMATED COUNT: 14.4 % (ref 11.5–14.5)
GFR SERPLBLD CREATININE-BSD FMLA CKD-EPI: >60 ML/MIN/1.73/M2
GLUCOSE SERPL-MCNC: 86 MG/DL (ref 70–110)
HCT VFR BLD AUTO: 39 % (ref 37–48.5)
HCV AB SERPL QL IA: NEGATIVE
HDLC SERPL-MCNC: 36 MG/DL (ref 40–75)
HDLC SERPL: 24 % (ref 20–50)
HGB BLD-MCNC: 12.8 GM/DL (ref 12–16)
HIV 1+2 AB+HIV1 P24 AG SERPL QL IA: NEGATIVE
IMM GRANULOCYTES # BLD AUTO: 0.01 K/UL (ref 0–0.04)
IMM GRANULOCYTES NFR BLD AUTO: 0.2 % (ref 0–0.5)
LDLC SERPL CALC-MCNC: 86.2 MG/DL (ref 63–159)
LYMPHOCYTES # BLD AUTO: 1.79 K/UL (ref 1–4.8)
MCH RBC QN AUTO: 27.3 PG (ref 27–31)
MCHC RBC AUTO-ENTMCNC: 32.8 G/DL (ref 32–36)
MCV RBC AUTO: 83 FL (ref 82–98)
NONHDLC SERPL-MCNC: 114 MG/DL
NUCLEATED RBC (/100WBC) (OHS): 0 /100 WBC
PLATELET # BLD AUTO: 400 K/UL (ref 150–450)
PMV BLD AUTO: 11.5 FL (ref 9.2–12.9)
POTASSIUM SERPL-SCNC: 3.7 MMOL/L (ref 3.5–5.1)
PROT SERPL-MCNC: 7.9 GM/DL (ref 6–8.4)
RBC # BLD AUTO: 4.69 M/UL (ref 4–5.4)
RELATIVE EOSINOPHIL (OHS): 0.5 %
RELATIVE LYMPHOCYTE (OHS): 29.7 % (ref 18–48)
RELATIVE MONOCYTE (OHS): 8.3 % (ref 4–15)
RELATIVE NEUTROPHIL (OHS): 60.5 % (ref 38–73)
SODIUM SERPL-SCNC: 143 MMOL/L (ref 136–145)
TRIGL SERPL-MCNC: 139 MG/DL (ref 30–150)
TSH SERPL-ACNC: 1.18 UIU/ML (ref 0.4–4)
WBC # BLD AUTO: 6.02 K/UL (ref 3.9–12.7)

## 2025-07-02 PROCEDURE — 99213 OFFICE O/P EST LOW 20 MIN: CPT | Mod: PBBFAC | Performed by: STUDENT IN AN ORGANIZED HEALTH CARE EDUCATION/TRAINING PROGRAM

## 2025-07-02 PROCEDURE — 80053 COMPREHEN METABOLIC PANEL: CPT

## 2025-07-02 PROCEDURE — 99999 PR PBB SHADOW E&M-EST. PATIENT-LVL III: CPT | Mod: PBBFAC,,, | Performed by: STUDENT IN AN ORGANIZED HEALTH CARE EDUCATION/TRAINING PROGRAM

## 2025-07-02 PROCEDURE — 36415 COLL VENOUS BLD VENIPUNCTURE: CPT

## 2025-07-02 PROCEDURE — 85025 COMPLETE CBC W/AUTO DIFF WBC: CPT

## 2025-07-02 PROCEDURE — 80061 LIPID PANEL: CPT

## 2025-07-02 PROCEDURE — 86803 HEPATITIS C AB TEST: CPT

## 2025-07-02 PROCEDURE — 84443 ASSAY THYROID STIM HORMONE: CPT

## 2025-07-02 PROCEDURE — 87389 HIV-1 AG W/HIV-1&-2 AB AG IA: CPT

## 2025-07-02 RX ORDER — FLUOXETINE 20 MG/1
TABLET ORAL
Qty: 90 TABLET | Refills: 3 | Status: SHIPPED | OUTPATIENT
Start: 2025-07-02

## 2025-07-02 NOTE — TELEPHONE ENCOUNTER
Copied from CRM #[id number]. Topic: General Inquiry - Patient Advice  >> Jul 2, 2025  3:12 PM Naresh wrote:  Type: Patient Call Back    Who called: patient    What is the request in detail: patient will be late    Can the clinic reply by [facility]?    Would the patient rather a call back or a response via [facility]? call    Best call back number: [phone]  Additional Information:

## 2025-08-05 ENCOUNTER — PATIENT OUTREACH (OUTPATIENT)
Dept: ADMINISTRATIVE | Facility: HOSPITAL | Age: 30
End: 2025-08-05
Payer: MEDICAID

## 2025-08-05 NOTE — PROGRESS NOTES
Population Health Chart Review & Patient Outreach Details      Additional [facility] Health Notes:      Pre-chart review          Updates Requested / Reviewed:      Updated Care Coordination Note, Care Everywhere, External Sources: [facility] and [employer], Care Team Updated, and Immunizations Reconciliation Completed or Queried: [facility] Topics Overdue:      [facility] Score: 1     Cervical Cancer Screening                       Health Maintenance Topic(s) Outreach Outcomes & Actions Taken:    Cervical Cancer Screening - Outreach Outcomes & Actions Taken  : External Records Uploaded & Care Team Updated if Applicable and only able to locate 2021 report from [employer]

## 2025-08-11 DIAGNOSIS — F41.0 PANIC DISORDER: ICD-10-CM

## 2025-08-11 DIAGNOSIS — F32.A ANXIETY AND DEPRESSION: ICD-10-CM

## 2025-08-11 DIAGNOSIS — F41.9 ANXIETY AND DEPRESSION: ICD-10-CM

## 2025-08-11 RX ORDER — BUSPIRONE HYDROCHLORIDE 5 MG/1
5 TABLET ORAL 3 TIMES DAILY
Qty: 90 TABLET | Refills: 11 | Status: SHIPPED | OUTPATIENT
Start: 2025-08-11 | End: 2026-08-11

## (undated) DEVICE — APPLIER CLIP EPIX UNIV 5X34

## (undated) DEVICE — TROCAR ENDOPATH XCEL 5X100MM

## (undated) DEVICE — STRIP STERI REIN CLSR 1/2X2IN

## (undated) DEVICE — SOL NS 1000CC

## (undated) DEVICE — SOL 9P NACL IRR PIC IL

## (undated) DEVICE — KIT WING PAD POSITIONING

## (undated) DEVICE — NDL INSUF ULTRA VERESS 120MM

## (undated) DEVICE — EVACUATOR WOUND BULB 100CC

## (undated) DEVICE — ADHESIVE DERMABOND ADVANCED

## (undated) DEVICE — TROCAR KII FIOS 5MM X 100MM

## (undated) DEVICE — IRRIGATOR ENDOSCOPY DISP.

## (undated) DEVICE — SOL CLEARIFY VISUALIZATION LAP

## (undated) DEVICE — CANNULA ENDOPATH XCEL 5X100MM

## (undated) DEVICE — PENCIL ELECTROSURG HOLST W/BLD

## (undated) DEVICE — SUT VICRYL 0 27 CT-2

## (undated) DEVICE — TROCAR ENDOPATH XCEL 11MM 10CM

## (undated) DEVICE — SYR B-D DISP CONTROL 10CC100/C

## (undated) DEVICE — DRAIN WND 15FRX3/16X4.7MM TRCR

## (undated) DEVICE — SEE MEDLINE ITEM 156925

## (undated) DEVICE — NDL HYPO REG 25G X 1 1/2

## (undated) DEVICE — ELECTRODE REM PLYHSV RETURN 9

## (undated) DEVICE — SUT MCRYL PLUS 4-0 PS2 27IN